# Patient Record
Sex: FEMALE | Race: WHITE | NOT HISPANIC OR LATINO | Employment: FULL TIME | URBAN - METROPOLITAN AREA
[De-identification: names, ages, dates, MRNs, and addresses within clinical notes are randomized per-mention and may not be internally consistent; named-entity substitution may affect disease eponyms.]

---

## 2023-09-21 LAB
ALANINE AMINOTRANSFERASE (SGPT) (U/L) IN SER/PLAS: 19 U/L (ref 7–45)
ALBUMIN (G/DL) IN SER/PLAS: 4.1 G/DL (ref 3.4–5)
ALKALINE PHOSPHATASE (U/L) IN SER/PLAS: 54 U/L (ref 33–110)
ANION GAP IN SER/PLAS: 9 MMOL/L (ref 10–20)
ASPARTATE AMINOTRANSFERASE (SGOT) (U/L) IN SER/PLAS: 20 U/L (ref 9–39)
BASOPHILS (10*3/UL) IN BLOOD BY AUTOMATED COUNT: 0.05 X10E9/L (ref 0–0.1)
BASOPHILS/100 LEUKOCYTES IN BLOOD BY AUTOMATED COUNT: 0.8 % (ref 0–2)
BILIRUBIN TOTAL (MG/DL) IN SER/PLAS: 0.6 MG/DL (ref 0–1.2)
CALCIDIOL (25 OH VITAMIN D3) (NG/ML) IN SER/PLAS: 28 NG/ML
CALCIUM (MG/DL) IN SER/PLAS: 9 MG/DL (ref 8.6–10.3)
CARBON DIOXIDE, TOTAL (MMOL/L) IN SER/PLAS: 29 MMOL/L (ref 21–32)
CHLORIDE (MMOL/L) IN SER/PLAS: 104 MMOL/L (ref 98–107)
CHOLESTEROL (MG/DL) IN SER/PLAS: 147 MG/DL (ref 0–199)
CHOLESTEROL IN HDL (MG/DL) IN SER/PLAS: 44.7 MG/DL
CHOLESTEROL/HDL RATIO: 3.3
CREATININE (MG/DL) IN SER/PLAS: 0.93 MG/DL (ref 0.5–1.05)
EOSINOPHILS (10*3/UL) IN BLOOD BY AUTOMATED COUNT: 0.3 X10E9/L (ref 0–0.7)
EOSINOPHILS/100 LEUKOCYTES IN BLOOD BY AUTOMATED COUNT: 4.6 % (ref 0–6)
ERYTHROCYTE DISTRIBUTION WIDTH (RATIO) BY AUTOMATED COUNT: 12.6 % (ref 11.5–14.5)
ERYTHROCYTE MEAN CORPUSCULAR HEMOGLOBIN CONCENTRATION (G/DL) BY AUTOMATED: 33 G/DL (ref 32–36)
ERYTHROCYTE MEAN CORPUSCULAR VOLUME (FL) BY AUTOMATED COUNT: 90 FL (ref 80–100)
ERYTHROCYTES (10*6/UL) IN BLOOD BY AUTOMATED COUNT: 4.79 X10E12/L (ref 4–5.2)
GFR FEMALE: 81 ML/MIN/1.73M2
GLUCOSE (MG/DL) IN SER/PLAS: 85 MG/DL (ref 74–99)
HEMATOCRIT (%) IN BLOOD BY AUTOMATED COUNT: 43 % (ref 36–46)
HEMOGLOBIN (G/DL) IN BLOOD: 14.2 G/DL (ref 12–16)
IMMATURE GRANULOCYTES/100 LEUKOCYTES IN BLOOD BY AUTOMATED COUNT: 0.3 % (ref 0–0.9)
LDL: 90 MG/DL (ref 0–99)
LEUKOCYTES (10*3/UL) IN BLOOD BY AUTOMATED COUNT: 6.5 X10E9/L (ref 4.4–11.3)
LYMPHOCYTES (10*3/UL) IN BLOOD BY AUTOMATED COUNT: 1.54 X10E9/L (ref 1.2–4.8)
LYMPHOCYTES/100 LEUKOCYTES IN BLOOD BY AUTOMATED COUNT: 23.7 % (ref 13–44)
MONOCYTES (10*3/UL) IN BLOOD BY AUTOMATED COUNT: 0.41 X10E9/L (ref 0.1–1)
MONOCYTES/100 LEUKOCYTES IN BLOOD BY AUTOMATED COUNT: 6.3 % (ref 2–10)
NEUTROPHILS (10*3/UL) IN BLOOD BY AUTOMATED COUNT: 4.17 X10E9/L (ref 1.2–7.7)
NEUTROPHILS/100 LEUKOCYTES IN BLOOD BY AUTOMATED COUNT: 64.3 % (ref 40–80)
PLATELETS (10*3/UL) IN BLOOD AUTOMATED COUNT: 221 X10E9/L (ref 150–450)
POTASSIUM (MMOL/L) IN SER/PLAS: 4.3 MMOL/L (ref 3.5–5.3)
PROTEIN TOTAL: 7.1 G/DL (ref 6.4–8.2)
SODIUM (MMOL/L) IN SER/PLAS: 138 MMOL/L (ref 136–145)
THYROTROPIN (MIU/L) IN SER/PLAS BY DETECTION LIMIT <= 0.05 MIU/L: 1.68 MIU/L (ref 0.44–3.98)
TRIGLYCERIDE (MG/DL) IN SER/PLAS: 60 MG/DL (ref 0–149)
UREA NITROGEN (MG/DL) IN SER/PLAS: 12 MG/DL (ref 6–23)
VLDL: 12 MG/DL (ref 0–40)

## 2023-09-26 PROBLEM — E66.811 CLASS 1 OBESITY WITH BODY MASS INDEX (BMI) OF 32.0 TO 32.9 IN ADULT: Status: ACTIVE | Noted: 2023-09-26

## 2023-09-26 PROBLEM — E66.9 CLASS 1 OBESITY WITH BODY MASS INDEX (BMI) OF 32.0 TO 32.9 IN ADULT: Status: ACTIVE | Noted: 2023-09-26

## 2023-09-26 PROBLEM — R07.89 CHEST PAIN, ATYPICAL: Status: ACTIVE | Noted: 2023-09-26

## 2023-09-26 PROBLEM — J45.990 ASTHMA, EXERCISE INDUCED (HHS-HCC): Status: ACTIVE | Noted: 2023-09-26

## 2023-09-26 RX ORDER — FLUTICASONE PROPIONATE AND SALMETEROL 250; 50 UG/1; UG/1
1 POWDER RESPIRATORY (INHALATION) EVERY 12 HOURS
COMMUNITY

## 2023-09-28 ENCOUNTER — HOSPITAL ENCOUNTER (INPATIENT)
Dept: DATA CONVERSION | Facility: HOSPITAL | Age: 38
LOS: 1 days | Discharge: HOME | End: 2023-09-29
Attending: EMERGENCY MEDICINE | Admitting: EMERGENCY MEDICINE
Payer: COMMERCIAL

## 2023-09-28 DIAGNOSIS — K22.2 ESOPHAGEAL OBSTRUCTION: ICD-10-CM

## 2023-09-28 LAB
ALANINE AMINOTRANSFERASE (SGPT) (U/L) IN SER/PLAS: 25 U/L (ref 7–45)
ALBUMIN (G/DL) IN SER/PLAS: 4.1 G/DL (ref 3.4–5)
ALKALINE PHOSPHATASE (U/L) IN SER/PLAS: 60 U/L (ref 33–110)
ANION GAP IN SER/PLAS: 10 MMOL/L (ref 10–20)
ASPARTATE AMINOTRANSFERASE (SGOT) (U/L) IN SER/PLAS: 32 U/L (ref 9–39)
BILIRUBIN TOTAL (MG/DL) IN SER/PLAS: 0.4 MG/DL (ref 0–1.2)
CALCIUM (MG/DL) IN SER/PLAS: 8.9 MG/DL (ref 8.6–10.3)
CARBON DIOXIDE, TOTAL (MMOL/L) IN SER/PLAS: 26 MMOL/L (ref 21–32)
CHLORIDE (MMOL/L) IN SER/PLAS: 103 MMOL/L (ref 98–107)
CREATININE (MG/DL) IN SER/PLAS: 0.82 MG/DL (ref 0.5–1.05)
ERYTHROCYTE DISTRIBUTION WIDTH (RATIO) BY AUTOMATED COUNT: 12.4 % (ref 11.5–14.5)
ERYTHROCYTE MEAN CORPUSCULAR HEMOGLOBIN CONCENTRATION (G/DL) BY AUTOMATED: 33.8 G/DL (ref 32–36)
ERYTHROCYTE MEAN CORPUSCULAR VOLUME (FL) BY AUTOMATED COUNT: 88 FL (ref 80–100)
ERYTHROCYTES (10*6/UL) IN BLOOD BY AUTOMATED COUNT: 4.86 X10E12/L (ref 4–5.2)
GFR FEMALE: >90 ML/MIN/1.73M2
GLUCOSE (MG/DL) IN SER/PLAS: 114 MG/DL (ref 74–99)
HEMATOCRIT (%) IN BLOOD BY AUTOMATED COUNT: 42.6 % (ref 36–46)
HEMOGLOBIN (G/DL) IN BLOOD: 14.4 G/DL (ref 12–16)
LEUKOCYTES (10*3/UL) IN BLOOD BY AUTOMATED COUNT: 15.3 X10E9/L (ref 4.4–11.3)
MAGNESIUM (MG/DL) IN SER/PLAS: 1.9 MG/DL (ref 1.6–2.4)
PHOSPHATE (MG/DL) IN SER/PLAS: 2 MG/DL (ref 2.5–4.9)
PLATELETS (10*3/UL) IN BLOOD AUTOMATED COUNT: 260 X10E9/L (ref 150–450)
POTASSIUM (MMOL/L) IN SER/PLAS: 4.1 MMOL/L (ref 3.5–5.3)
PROTEIN TOTAL: 7.4 G/DL (ref 6.4–8.2)
SODIUM (MMOL/L) IN SER/PLAS: 135 MMOL/L (ref 136–145)
UREA NITROGEN (MG/DL) IN SER/PLAS: 15 MG/DL (ref 6–23)

## 2023-09-29 VITALS — BODY MASS INDEX: 30.18 KG/M2 | WEIGHT: 159.83 LBS | HEIGHT: 61 IN

## 2023-09-29 PROBLEM — K22.2 OBSTRUCTION, ESOPHAGUS: Status: ACTIVE | Noted: 2023-09-29

## 2023-09-29 RX ORDER — ONDANSETRON HYDROCHLORIDE 2 MG/ML
4 INJECTION, SOLUTION INTRAVENOUS EVERY 6 HOURS PRN
Status: DISCONTINUED | OUTPATIENT
Start: 2023-09-30 | End: 2023-09-29 | Stop reason: HOSPADM

## 2023-09-29 RX ORDER — SODIUM CHLORIDE, SODIUM LACTATE, POTASSIUM CHLORIDE, CALCIUM CHLORIDE 600; 310; 30; 20 MG/100ML; MG/100ML; MG/100ML; MG/100ML
70 INJECTION, SOLUTION INTRAVENOUS CONTINUOUS
Status: DISCONTINUED | OUTPATIENT
Start: 2023-09-30 | End: 2023-09-29 | Stop reason: HOSPADM

## 2023-09-29 RX ORDER — FAMOTIDINE 10 MG/ML
10 INJECTION INTRAVENOUS EVERY 12 HOURS SCHEDULED
Status: DISCONTINUED | OUTPATIENT
Start: 2023-09-30 | End: 2023-09-29 | Stop reason: HOSPADM

## 2023-09-29 RX ORDER — FENTANYL CITRATE 50 UG/ML
50 INJECTION, SOLUTION INTRAMUSCULAR; INTRAVENOUS
Status: DISCONTINUED | OUTPATIENT
Start: 2023-09-30 | End: 2023-09-29 | Stop reason: HOSPADM

## 2023-09-29 RX ORDER — LEVOFLOXACIN 5 MG/ML
750 INJECTION, SOLUTION INTRAVENOUS EVERY 24 HOURS
Status: DISCONTINUED | OUTPATIENT
Start: 2023-09-30 | End: 2023-09-29 | Stop reason: HOSPADM

## 2023-09-30 NOTE — H&P
Service:   Critical Care Service:  ·  Service MICU     History of Present Illness:   Pregnant/Lactating:  ·  Are You Pregnant no (1)   ·  Are You Currently Breastfeeding no (1)     Admission Reason: Esophageal food impaction   HPI:    ZULEIMA KHAN is a pleasant 38-year-old female with only real significant PMH Asthma (controlled) and GERD during pregnancy.  Patient presented to the ED today after  developing esophageal food impaction from swallowing corndogs.  Patient denies any history of dysphagia, odynophagia, or esophageal strictures.  About 1 hour PTA patient withas eating a corn dog and she felt to get stuck in her throat.  After that anything she tried to eat or drink she threw up so she quit eating and drinking however she was still experiencing difficulty swallowing and pressure  discomfort in her throat. Denies alleviating factors.  Patient denies any other symptoms such as fever chest pain palpitation shortness of breath.  She states she is in her usual state of health.  In ED patient was awake alert and oriented, no significant distress, protecting airway.  Patient was given 1 mg IM glucagon and 1 mg IV Ativan in attempt to relax the esophagus and LES and allow food to pass.  Patient's symptoms did not improve with these 2 medications.  ED patient attempted to drink a little water and immediately brought it back up.  Patient denies any history of this in the past.    Dr. Ferreira from GI was immediately consulted.  She came in to perform urgent EGD for removal of food bolus.  Anesthesia was consulted for intubation.  During endoscopy patient was unable to be intubated and her SPO2 started dropping so a CODE BLUE was called however patient never went into true cardiopulmonary arrest.   Multiple providers attempted intubation unfortunately it was unsuccessful.   There was discussion about transferring patient downtown given severity of how difficult her airway is however patient seemed to improve.   Likely passed most if not all of food bolus.  Patient will be admitted for observation to the ICU for close monitoring overnight given risk for decompensation.  Patient arrived to ICU hemodynamically stable, awake alert and oriented, protecting airway, able to swallow secretions, only complaining of some scratchy burning pain/discomfort in her throat which is most likely secondary to multiple intubation attempts.  Of note patient did have some trauma and bleeding to her oropharynx from multiple intubation attempts however there is no active bleeding now.        PMH: As  above  PSH: EGD 9/28/23  Family Hx: No GI disorders including cancer  Social Hx: Social etoh use, denies tobacco or illicit drug use, , works at [employer]      Social History:   Social History:  Smoking Status unable to assess (2)              Allergies:  ·  No Known Allergies :     Medications Prior to Admission:   Admission Medication Reconciliation has not been completed for this patient.    Review of Systems:   ENMT: POSITIVE: Throat Pain     All Other Systems: All other systems reviewed and  are negative     Objective:   Objective Information:    ·  Objective Information      T   P  R  BP   MAP  SpO2   Value  36.2  106  11  101/70   85  98%  Date/Time 9/28 19:44 9/28 21:00 9/28 21:00 9/28 21:00  9/28 21:00 9/28 21:00  Range  (36.2C - 36.6C )  (86 - 106 )  (11 - 18 )  (101 - 136 )/ (67 - 92 )  (77 - 93 )  (91% - 100% )   As of 28-Sep-2023 19:45:00, patient is on 2 L/min of oxygen via nasal cannula.      Pain reported at 9/28 20:30: 8 = Severe        Date:            Weight/Scale Type:  28-Sep-2023 14:30  72.5  kg           Physical Exam by System:    Neurological: alert and oriented x3, intact senses,  motor, response and reflexes, normal strength   Cardiovascular: Regular, rate and rhythm, no murmurs,  2+ equal pulses of the extremities, normal S 1and S 2   Respiratory/Thorax: Patent airways, CTAB, normal  breath sounds with good chest  expansion, thorax symmetric   Gastrointestinal: Protuberant, soft, non-tender,  no rebound tenderness or guarding, no masses palpable, no organomegaly, +BS, no bruits   Skin: Warm and dry, no lesions, no rashes   Musculoskeletal: ROM intact, no joint swelling, normal  strength   Constitutional: Well developed, awake/alert/oriented  x3, no distress, alert and cooperative   Eyes: PERRL, EOMI, clear sclera   ENMT: Few small scratches, erythema and mild swelling  to posterior oropharynx noted, no active bleeding   Head/Neck: Neck supple, no apparent injury, thyroid  without mass or tenderness, No JVD, trachea midline, no bruits   Extremities: normal extremities, no cyanosis edema,  contusions or wounds, no clubbing   Lymphatic: No significant lymphadenopathy   Psychological: Appropriate mood and behavior     Medications:    Medications:          Continuous Medications       --------------------------------    1. Lactated Ringers Infusion:  1000  mL  IntraVenous  <Continuous>         Scheduled Medications       --------------------------------    1. Famotidine Injectable:  10  mg  IntraVenous Push  Every 12 Hours    2. Glucagon Injectable:  1  mg  IntraVenous Push  Once         PRN Medications       --------------------------------    1. fentaNYL Injectable:  50  microgram(s)  IntraVenous Push  Every 3 Hours    2. Ondansetron Injectable:  4  mg  IntraVenous Push  Every 6 Hours          Recent Lab Results:    Results:        I have reviewed these laboratory results:    Complete Blood Count  28-Sep-2023 21:11:00      Result Value    White Blood Cell Count  15.3   H   Red Blood Cell Count  4.86    HGB  14.4    HCT  42.6    MCV  88    MCHC  33.8    PLT  260    RDW-CV  12.4         Assessment and Plan:   Neurology:  Diagnosis:    38-year-old female with only real significant PMH Asthma (controlled) and GERD during pregnancy.  Patient presented to the ED today after developing esophageal food  impaction from swallowing  corndogs.  Patient denies any history of dysphagia, odynophagia, or esophageal strictures.  About 1 hour PTA patient was eating a corn dog and she felt to get stuck in her throat.  After that anything she tried to eat or drink she threw up so she quit eating and drinking however she was still experiencing difficulty swallowing and pressure discomfort  in her throat. Denies alleviating factors.  Patient denies any other symptoms such as fever chest pain palpitation shortness of breath.  She states she is in her usual state of health.  In ED patient was awake alert and oriented, no significant distress, protecting airway.  Patient was given 1 mg IM glucagon and 1 mg IV Ativan in attempt to relax the esophagus and LES and allow food to pass.  Patient's symptoms did not improve with these 2 medications.  ED patient attempted to drink a little water and immediately brought it back up.  Patient denies any history of this in the past.    Dr. Ferreira from GI was immediately consulted.  She came in to perform urgent EGD for removal of food bolus.  Anesthesia was consulted for intubation.  During endoscopy patient was unable to be intubated and her SPO2 started dropping so a CODE BLUE was called however patient never went into true cardiopulmonary arrest.   Multiple providers attempted intubation unfortunately it was unsuccessful.   There was discussion about transferring patient downtown given severity of how difficult her airway is however patient seemed to improve.  Likely passed most if not all of food bolus.  Patient will be admitted for observation to the ICU for close monitoring overnight given risk for decompensation.  Patient arrived to ICU hemodynamically stable, awake alert and oriented, protecting airway, able to swallow secretions, only complaining of some scratchy burning pain/discomfort in her throat which is most likely secondary to multiple intubation attempts.  Of note patient did have some trauma and  "bleeding to her oropharynx from multiple intubation attempts however there is no active bleeding now.      ASSESSMENT/ PLAN  #Esophageal food bolus impaction  -2/2 corndog, 1mg IM glucagon w/ 1 mg IV Ativan given in ED with no immediate success, s/p EGD, given improvement in symptoms, likely passed food bolus   -give another dose of Glucagon but give 1 mg IV  -monitor closely in ICU overnight  -NPO, IVF w/ LR @ 70/h while NPO  -will slowly start with ice chips tomorrow if able and advance as able  -Analgesia/anti-emetics as needed  -if patient does not decompensate overnight and can safely swallow, will DC home tomorrow    #Pharyngitis  -2/2 multiple intubation attempts, no concerning trauma  -Analgesia  -Ok for cepacol lozenge when able to have some oral intake    #Difficult airway  -unsuccessful intubation after multiple attempts, mild trauma posterior oropharynx  -contact transfer center immediately if patient decompensates from airway perspective      Myrna South AGACNP  Adult Gerontology Acute Care Nurse Practitioner  Pulmonary and Critical Care Medicine  [facility]  [phone]      ***Of note, this documentation is completed using the Dragon Dictation system (voice recognition software). There may be spelling and/or grammatical errors that  were not corrected prior to final submission.***      Code Status:  ·  Code Status Full Code       Electronic Signatures:  Myrna South (APRN-CNP)  (Signed 28-Sep-2023 21:59)   Authored: Service, History of Present Illness, Comorbidities,  Social History, Allergies, Medications Prior to Admission, Review of Systems, Objective, Assessment and Plan, Note Completion      Last Updated: 28-Sep-2023 21:59 by Myrna South (APRN-CNP)    References:  1.  Data Referenced From \"Triage - ED\" 28-Sep-2023 14:30  2.  Data Referenced From \"Consult-Gastroenterology\" 28-Sep-2023 17:40   "

## 2023-09-30 NOTE — DISCHARGE SUMMARY
Send Summary:   Discharge Summary Providers:  Provider Role Provider Name   · Referring Catrina Rivera   · Attending Gharibeh, Tarek   · Primary Catrina Rivera       Note Recipients: Catrina Rivera MD - [id number]  [preferred]       Discharge:    Summary:   Admission Date: .28-Sep-2023 14:28:00   Discharge Date: 29-Sep-2023   Attending Physician at Discharge: Gharibeh, Tarek   Admission Reason: Esophageal food impaction (1)   Final Discharge Diagnoses: Esophageal obstruction  due to food impaction   Procedures: none   Condition at Discharge: Satisfactory   Disposition at Discharge: .Home   Vital Signs:        T   P  R  BP   MAP  SpO2   Value  37.1  106  18  109/58   80  98%  Date/Time 9/29 14:00 9/29 14:00 9/29 12:00 9/29 13:00  9/29 13:00 9/29 14:00  Range  (36.2C - 37.1C )  (86 - 108 )  (11 - 33 )  (96 - 136 )/ (57 - 92 )  (76 - 98 )  (91% - 100% )   As of 28-Sep-2023 21:00:00, patient is on 2 L/min of oxygen via room air.  Highest temp of 37.1 C was recorded at 9/29 14:00    Date:            Weight/Scale Type:  Height:   28-Sep-2023 20:00  72.5  kg / bed  154.9  cm  Hospital Course:    ZULEIMA KHAN is a pleasant 38-year-old female with only real significant PMH Asthma (controlled) and GERD during pregnancy.  Patient presented to the ED today after  developing esophageal food impaction from swallowing corndogs. Patient denies any history of dysphagia, odynophagia, or esophageal strictures. About 1 hour PTA patient was eating a corn dog and she felt it got stuck in her throat.  After that anything  she tried to eat or drink she threw up so she quit eating and drinking however she was still experiencing difficulty swallowing and pressure discomfort in her throat/ epigastric area. Patient was given 1 mg IM glucagon and 1 mg IV Ativan in attempt to  relax the esophagus and LES and allow food to pass. Symptoms did not improve with these 2 medications and continued to have vomiting with attempts with oral  intake. Dr. Ferreira from GI was immediately consulted and decision was made to perform urgent EGD  for removal of food bolus. After induction of anesthetic agents anesthesia was unable to successfully intubate patient and a CODE BLUE was activated (there was no loss of pulse during procedure) due to significant hypoxia into 40-60s. Multiple providers  attempted intubation unfortunately it was unsuccessful. At this time decision was made to abort procedure. Was given additional dose of glucagon and had relief of epigastric pressure. Was monitored in the ICU overnight for evaluation of airway. The next  day was able to tolerate a soft diet, maintaining adequate oxygen saturations, and no signs/ symptoms of distress. Post procedue xray of neck showed prevertebral soft tissue thickening with narrowing of the airway and airspace opacity of the right lower  lobe suggestive of pneumonia. Was discharged home in stable condition with strict instructions to return to the emergency room if difficulty swallowing with vomiting and/ or difficulty breathing along with prescription for 7-day course of augmentin and  protonix. Instructed patient to notify providers in the future of difficulty airway/ intubation during this hospitalization.           Discharge Information:    and Continuing Care:   Lab Results - Pending:    None  Radiology Results - Pending: None   Discharge Instructions:    Activity:           activity as tolerated.          May shower..            May return to school/work.  Monday            May drive..      Nutrition/Diet:           regular          Diet Consistency/Texture:   soft    Additional Orders:           Additional Instructions:   Return to the emergency room with any difficulty swallowing, talking, and/ or breathing  Call to schedule follow up with GI Dr. Ferreira  Call to schedule follow up with primary care provider    Follow Up Appointments:    Follow-Up Appointment 01:           Physician/Dept/Service:  "   Gastroenterology          Reason for Referral:   Food impaction, hospital follow up          Location:   [facility]          Phone Number:   [phone]    Follow-Up Appointment 02:           Physician/Dept/Service:   primary care          Reason for Referral:   hospital follow up, pneumonia          Call to Schedule in:   1 week    Discharge Medications: Home Medication   amoxicillin-clavulanate 600 mg-42.9 mg/5 mL oral liquid - 5 milliliter(s) orally 2 times a day   Protonix 40 mg oral delayed release tablet - 1 tab(s) orally once a day      PRN Medication     DNR Status:   ·  Code Status Code Status order at time of discharge: Full Code       Electronic Signatures:  Patti Salgado (APRN-CNP)  (Signed 29-Sep-2023 15:20)   Authored: Send Summary, Summary Content, Ongoing Care,  DNR Status, Note Completion      Last Updated: 29-Sep-2023 15:20 by Patti Salgado (APRN-CNP)    References:  1.  Data Referenced From \"History and Physical - Critical Care\" 28-Sep-2023 21:17   "

## 2023-09-30 NOTE — PROGRESS NOTES
Service: Gastroenterology     Subjective Data:   ZULEIMA KHAN is a 38 year old Female who is Hospital Day # 2.     Today, she is feeling okay.  She denies any nausea or vomiting.  She is tolerating ice chips and clear liquids.  She has tried and soft at lunch.    Objective Data:     Objective Information:      T   P  R  BP   MAP  SpO2   Value  36.3  96  13  105/68   82  95%  Date/Time 9/29 6:00 9/29 8:00 9/29 8:00 9/29 8:00  9/29 8:00 9/29 8:00  Range  (36.2C - 36.6C )  (86 - 108 )  (11 - 33 )  (96 - 136 )/ (57 - 92 )  (76 - 98 )  (91% - 100% )   As of 28-Sep-2023 21:00:00, patient is on 2 L/min of oxygen via room air.      Pain reported at 9/29 0:00: sleeping    ---- Intake and Output  -----  Mn/Dy/Year Time  Intake   Output  Net  Sep 29, 2023 6:00 am  660   0  660    The Intake and Output Totals for the last 24 hours are:      Intake   Output  Net      660   null  null    Physical Exam Narrative:  ·  Physical Exam:    General appearance: No acute distress, cooperative.  Eyes: Nonicteric, no redness or proptosis  Ears, nose, mouth, and throat: Moist mucous membranes,  Neck: Supple, no lymphadenopathy or thyromegaly  Lungs: Clear to auscultation bilaterally  CV: Regular rate and rhythm, no murmur; no pitting edema in the lower extremities  Abd: soft, non-tender; non-distended; normal active bowel sounds  Skin: No rashes or lesions; no ecchymosis  MSK: No deformities or joint edema/redness/tenderness  Neuro: Alert and oriented ×3, non-focal    Medication:    Medications:          Continuous Medications       --------------------------------    1. Lactated Ringers Infusion:  1000  mL  IntraVenous  <Continuous>         Scheduled Medications       --------------------------------    1. Famotidine Injectable:  10  mg  IntraVenous Push  Every 12 Hours    2. levoFLOXacin (LEVAQUIN) 750 mg IVPB/ Premix 150 mL:  150  mL  IntraVenous Piggyback  Every 24 Hours         PRN Medications        --------------------------------    1. fentaNYL Injectable:  50  microgram(s)  IntraVenous Push  Every 3 Hours    2. Ondansetron Injectable:  4  mg  IntraVenous Push  Every 6 Hours        Recent Lab Results:    Results:    CBC: 9/28/2023 21:11              \     Hgb     /                              \     14.4       /  WBC  ----------------  Plt               15.3 H    ----------------    260              /     Hct     \                              /     42.6       \            RBC: 4.86     MCV: 88           CMP: 9/28/2023 21:11  NA+        Cl-     BUN  /                         135 L   103    15  /  --------------------------------  Glucose                ---------------------------  114 H    K+     HCO3-   Creat \                         4.1    26    0.82  \           \  T Bili  /                    \  0.4  /  AST  x ---- x ALT        32 x ---- x 25         /  Alk P   \               /  60  \  Calcium : 8.9     Anion Gap : 10     Albumin : 4.1     T Protein : 7.4           Radiology Results:    Results:        Impression:    1. Prevertebral soft tissue thickening with narrowing of the airway,  prevertebral abscess or edema cannot be excluded. Contrast-enhanced  CT can be obtained for further evaluation.  2. Airspace opacity at the right lung base, suggestive of pneumonia.  This can also be better evaluated on CT.        MACRO:  None     Xray Neck Soft Tissue [Sep 28 2023 11:26PM]      Assessment and Plan:   Code Status:  ·  Code Status Full Code     Assessment:    38-year-old female presents emergency room with acute onset of a food impaction at 1:00 while she was eating corn dogs.  It is not cleared with attempts at glucagon  and Ativan.  She is otherwise hemodynamically stable with no significant medical history or symptoms of dysphagia odynophagia or early satiety.  She does report having heartburn during her pregnancy.  She was scheduled for an urgent EGD for removal of  the food bolus.  Anesthesia  for intubation patient is agreeable.  Unfortunately multiple attempts for intubation were made which were unsuccessful.  CODE BLUE was called for assistance and with bronchoscope intubation was still unsuccessful.  She was  stabilized on oxygen and sent to MICU for further management.    Food impaction- resolved, will need EGD for EoE and GERD  Difficult airway    Plan  Recommend outpatient EGD for further evaluation  Recommend PPI 40 mg daily until scope  Advance diet as tolerated  Continue supportive care per primary team    Discussed with Dr. Ferreira, GI to sign off  Yamileth Barrett PA-C    Plan of Care Reviewed With:  Plan of Care Reviewed With: patient; spouse       Electronic Signatures:  Yamileth Barrett (PAC)  (Signed 29-Sep-2023 11:19)   Authored: Service, Subjective Data, Objective Data, Assessment  and Plan, Note Completion      Last Updated: 29-Sep-2023 11:19 by Yamileth Barrett (PAC)

## 2023-10-02 ENCOUNTER — PATIENT OUTREACH (OUTPATIENT)
Dept: PRIMARY CARE | Facility: CLINIC | Age: 38
End: 2023-10-02
Payer: COMMERCIAL

## 2023-10-02 DIAGNOSIS — T18.128A ESOPHAGEAL OBSTRUCTION DUE TO FOOD IMPACTION: ICD-10-CM
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
## 2023-10-02 RX ORDER — PANTOPRAZOLE SODIUM 40 MG/1
40 TABLET, DELAYED RELEASE ORAL DAILY
COMMUNITY
Start: 2023-09-30 | End: 2023-11-13 | Stop reason: SDUPTHER

## 2023-10-02 RX ORDER — AMOXICILLIN AND CLAVULANATE POTASSIUM 600; 42.9 MG/5ML; MG/5ML
POWDER, FOR SUSPENSION ORAL
COMMUNITY
Start: 2023-09-29 | End: 2023-10-10 | Stop reason: ALTCHOICE

## 2023-10-02 NOTE — PROGRESS NOTES
Discharge Facility:  [facility]    Discharge Diagnosis:  Esophageal obstruction  due to food impaction    Admission Date:9/28/23  Discharge Date: 9/29/23    PCP Appointment Date:   10/10/2023 10:30 AM (30 min)    PRIMARY CARE ESTABLISHED   Catrina Wright MD     Specialist Appointment Date:    10/16/2023 2:20 PM (20 min)    GASTRO ESTABLISHED PATIENT VT   Yamileth Barrett PA-C     Hospital Encounter and Summary: not available at this time to link but in EPIC   See discharge assessment below for further details  I introduced myself and the TCM program to Odessa Hauser. I gave my contact information for any further questions.  Engagement  Call Start Time: 1311 (10/2/2023  1:15 PM)    Medications  Medications reviewed with patient/caregiver?: Yes (10/2/2023  1:15 PM)  Is the patient having any side effects they believe may be caused by any medication additions or changes?: No (10/2/2023  1:15 PM)  Does the patient have all medications ordered at discharge?: Yes (10/2/2023  1:15 PM)  Prescription Comments: Discharge Medications:  amoxicillin-clavulanate 600 mg-42.9 mg/5 mL oral liquid - 5 milliliter(s) orally 2 times a day  &  Protonix 40 mg oral delayed release tablet - 1 tab(s) orally once a day (10/2/2023  1:15 PM)  Is the patient taking all medications as directed (includes completed medication regime)?: Yes (10/2/2023  1:15 PM)  Care Management Interventions: Provided patient education (10/2/2023  1:15 PM)    Appointments  Does the patient have a primary care provider?: Yes (Catrina Wright MD) (10/2/2023  1:15 PM)  Care Management Interventions: Verified appointment date/time/provider (10/2/2023  1:15 PM)  Has the patient kept scheduled appointments due by today?: Yes (10/2/2023  1:15 PM)  Care Management Interventions: Educated on importance of keeping appointment (10/2/2023  1:15 PM)    Self Management  Has home health visited the patient within 72 hours of  discharge?: Not applicable (10/2/2023  1:15 PM)    Patient Teaching  Does the patient have access to their discharge instructions?: Yes (10/2/2023  1:15 PM)  Care Management Interventions: Reviewed instructions with patient; Educated on MyChart (10/2/2023  1:15 PM)  What is the patient's perception of their health status since discharge?: Returned to baseline/stable (10/2/2023  1:15 PM)  Is the patient/caregiver able to teach back the hierarchy of who to call/visit for symptoms/problems? PCP, Specialist, Home Health nurse, Urgent Care, ED, 911: Yes (10/2/2023  1:15 PM)    Wrap Up  Call End Time: 1317 (10/2/2023  1:15 PM)

## 2023-10-07 PROBLEM — Z87.891 FORMER SMOKER: Status: ACTIVE | Noted: 2023-10-07

## 2023-10-07 PROBLEM — J45.909 RAD (REACTIVE AIRWAY DISEASE) (HHS-HCC): Status: ACTIVE | Noted: 2023-10-07

## 2023-10-07 RX ORDER — GABAPENTIN 100 MG/1
CAPSULE ORAL
COMMUNITY
Start: 2016-03-22 | End: 2023-10-10 | Stop reason: ALTCHOICE

## 2023-10-07 RX ORDER — IBUPROFEN 800 MG/1
800 TABLET ORAL EVERY 6 HOURS PRN
COMMUNITY
Start: 2023-07-06 | End: 2023-10-10 | Stop reason: WASHOUT

## 2023-10-07 RX ORDER — NAPROXEN 500 MG/1
1 TABLET ORAL
COMMUNITY
Start: 2019-03-27 | End: 2023-10-10 | Stop reason: WASHOUT

## 2023-10-10 ENCOUNTER — OFFICE VISIT (OUTPATIENT)
Dept: PRIMARY CARE | Facility: CLINIC | Age: 38
End: 2023-10-10
Payer: COMMERCIAL

## 2023-10-10 VITALS
DIASTOLIC BLOOD PRESSURE: 78 MMHG | TEMPERATURE: 98.1 F | BODY MASS INDEX: 31.95 KG/M2 | OXYGEN SATURATION: 99 % | HEART RATE: 73 BPM | HEIGHT: 61 IN | WEIGHT: 169.25 LBS | SYSTOLIC BLOOD PRESSURE: 122 MMHG

## 2023-10-10 DIAGNOSIS — T18.128A ESOPHAGEAL OBSTRUCTION DUE TO FOOD IMPACTION: Primary | ICD-10-CM
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
## 2023-10-10 DIAGNOSIS — K21.9 GASTROESOPHAGEAL REFLUX DISEASE WITHOUT ESOPHAGITIS: ICD-10-CM

## 2023-10-10 DIAGNOSIS — J45.990 ASTHMA, EXERCISE INDUCED (HHS-HCC): ICD-10-CM

## 2023-10-10 DIAGNOSIS — Z71.2 ENCOUNTER TO DISCUSS TEST RESULTS: ICD-10-CM

## 2023-10-10 PROCEDURE — 99214 OFFICE O/P EST MOD 30 MIN: CPT | Performed by: FAMILY MEDICINE

## 2023-10-10 PROCEDURE — 1036F TOBACCO NON-USER: CPT | Performed by: FAMILY MEDICINE

## 2023-10-10 ASSESSMENT — PATIENT HEALTH QUESTIONNAIRE - PHQ9
SUM OF ALL RESPONSES TO PHQ9 QUESTIONS 1 AND 2: 0
1. LITTLE INTEREST OR PLEASURE IN DOING THINGS: NOT AT ALL
2. FEELING DOWN, DEPRESSED OR HOPELESS: NOT AT ALL

## 2023-10-10 NOTE — ASSESSMENT & PLAN NOTE
Reviewed lab/testing results with the patient and provided patient education regarding the results.

## 2023-10-10 NOTE — PROGRESS NOTES
"Subjective   Chief complaint: Odessa Hauser is a 38 y.o. female who presents for Results (Patient in office today for 1 month follow up. ).    HPI:  Hospital follow up.  Has a history of asthma but that has been stable for her.  A couple weeks ago she had something get stuck in her esophagus and she was unable to swallow or drink.  She would have emesis from the obstruction.  She presented to the ER and had a scope done.  They were unable to intubate her and told her she had \"Small Neck Syndrome.\"  She coded during this event and the obstruction cleared on its own from her being relaxed.  She is going to be seen by Dr. Ferreira for an upper endoscopy on Monday, Octobert 16, 2023.  She is feeling much better now.  Is watching what she is eating; having soft foods now.          Objective   /78 (BP Location: Left arm, Patient Position: Sitting)   Pulse 73   Temp 36.7 °C (98.1 °F) (Temporal)   Ht 1.549 m (5' 1\")   Wt 76.8 kg (169 lb 4 oz)   SpO2 99%   BMI 31.98 kg/m²     Physical Exam  General:  Alert, oriented, no acute distress  Eyes:  Sclerae white, PER, conjunctivae clear  ENT:  No nasal congestion.    Neck: Supple  Respiratory:  Normal breath sounds.  No wheezing, rhonchi nor crackles.  No dyspnea.  Cardiovascular:  S1 and S2 positive.  Regular rate and rhythm.  No gallops.  No murmurs.  Vascular:  No edema.  Skin warm and dry.  CNS:  No gross neurological deficits.  Gait within normal limits.    Psychiatric:  Affect is positive and appropriate.  No depression.  No anxiety.     Review of Systems   I have reviewed and reconciled the medication list with the patient today.   Current Outpatient Medications:     albuterol 108 (90 Base) MCG/ACT inhaler, Inhale 2 puffs if needed for wheezing (every 4 to 6 hours)., Disp: , Rfl:     fluticasone propion-salmeteroL (Advair Diskus) 250-50 mcg/dose diskus inhaler, Inhale 1 puff every 12 hours. Rinse mouth with water after use to reduce aftertaste and incidence of " candidiasis. Do not swallow., Disp: , Rfl:     pantoprazole (ProtoNix) 40 mg EC tablet, Take 1 tablet (40 mg) by mouth once daily., Disp: , Rfl:      Imaging:  XR chest 1 view    Result Date: 9/28/2023  Interpreted By:  KODY PARKER DO MRN: [id number] Patient Name: ZULEIMA KHAN  STUDY: SOFT TISSUE NECK; CHEST 1 VIEW; ;  9/28/2023 10:00 pm; 9/28/2023 9:59 pm  INDICATION: R/O Aspiration .  Presented DX Code: K22.2 Esophageal obstruction  COMPARISON: Chest x-ray 07/12/2016.  ACCESSION NUMBER(S): [id number]; [id number]  ORDERING CLINICIAN: TETE PEARCE  FINDINGS: AP and lateral views of the soft tissue neck and AP view of the chest were obtained.  SOFT TISSUE NECK:  There is prevertebral soft tissue thickening. There is narrowing of the airway. The airway remains patent. The epiglottis does not appear thickened. No acute osseous changes.  CHEST:  Monitoring wires are overlying the patient. The cardiomediastinal silhouette is within normal limits. Airspace opacity noted at the right lung base. No pleural effusion or pneumothorax.      1. Prevertebral soft tissue thickening with narrowing of the airway, prevertebral abscess or edema cannot be excluded. Contrast-enhanced CT can be obtained for further evaluation. 2. Airspace opacity at the right lung base, suggestive of pneumonia. This can also be better evaluated on CT.   MACRO: None    XR soft tissue neck    Result Date: 9/28/2023  Interpreted By:  ELENA DO KODY MRN: [id number] Patient Name: ZULEIMA KHAN  STUDY: SOFT TISSUE NECK; CHEST 1 VIEW; ;  9/28/2023 10:00 pm; 9/28/2023 9:59 pm  INDICATION: R/O Aspiration .  Presented DX Code: K22.2 Esophageal obstruction  COMPARISON: Chest x-ray 07/12/2016.  ACCESSION NUMBER(S): [id number]; [id number]  ORDERING CLINICIAN: TETE PEARCE  FINDINGS: AP and lateral views of the soft tissue neck and AP view of the chest were obtained.  SOFT TISSUE NECK:  There is prevertebral soft tissue thickening. There is narrowing of the airway. The  airway remains patent. The epiglottis does not appear thickened. No acute osseous changes.  CHEST:  Monitoring wires are overlying the patient. The cardiomediastinal silhouette is within normal limits. Airspace opacity noted at the right lung base. No pleural effusion or pneumothorax.      1. Prevertebral soft tissue thickening with narrowing of the airway, prevertebral abscess or edema cannot be excluded. Contrast-enhanced CT can be obtained for further evaluation. 2. Airspace opacity at the right lung base, suggestive of pneumonia. This can also be better evaluated on CT.   MACRO: None       Labs reviewed:    Lab Results   Component Value Date    WBC 15.3 (H) 09/28/2023    HGB 14.4 09/28/2023    HCT 42.6 09/28/2023     09/28/2023    CHOL 147 09/21/2023    TRIG 60 09/21/2023    HDL 44.7 09/21/2023    ALT 25 09/28/2023    AST 32 09/28/2023     (L) 09/28/2023    K 4.1 09/28/2023     09/28/2023    CREATININE 0.82 09/28/2023    BUN 15 09/28/2023    CO2 26 09/28/2023    TSH 1.68 09/21/2023       Assessment/Plan   Problem List Items Addressed This Visit       Asthma, exercise induced     Controlled.  No change in management.         Encounter to discuss test results     Reviewed lab/testing results with the patient and provided patient education regarding the results.           Esophageal obstruction due to food impaction - Primary     Resolved,  reviewed.          Other Visit Diagnoses       Gastroesophageal reflux disease without esophagitis                Continue current medications as listed  Follow up in 6 months.

## 2023-10-16 ENCOUNTER — OFFICE VISIT (OUTPATIENT)
Dept: GASTROENTEROLOGY | Facility: CLINIC | Age: 38
End: 2023-10-16
Payer: COMMERCIAL

## 2023-10-16 VITALS
DIASTOLIC BLOOD PRESSURE: 83 MMHG | HEIGHT: 61 IN | BODY MASS INDEX: 32.25 KG/M2 | WEIGHT: 170.8 LBS | SYSTOLIC BLOOD PRESSURE: 132 MMHG | HEART RATE: 79 BPM

## 2023-10-16 DIAGNOSIS — T18.128A ESOPHAGEAL OBSTRUCTION DUE TO FOOD IMPACTION: ICD-10-CM
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
## 2023-10-16 DIAGNOSIS — R13.19 ESOPHAGEAL DYSPHAGIA: Primary | ICD-10-CM

## 2023-10-16 PROCEDURE — 1036F TOBACCO NON-USER: CPT | Performed by: PHYSICIAN ASSISTANT

## 2023-10-16 PROCEDURE — 99213 OFFICE O/P EST LOW 20 MIN: CPT | Performed by: PHYSICIAN ASSISTANT

## 2023-10-16 ASSESSMENT — ENCOUNTER SYMPTOMS
FEVER: 0
CHILLS: 0
HEMATURIA: 0
DIZZINESS: 0
EYE PAIN: 0
DYSURIA: 0
ARTHRALGIAS: 0
JOINT SWELLING: 0
NUMBNESS: 0
APPETITE CHANGE: 0
UNEXPECTED WEIGHT CHANGE: 0
HEADACHES: 0
SORE THROAT: 0
MYALGIAS: 0
WEAKNESS: 0
TROUBLE SWALLOWING: 0

## 2023-10-16 NOTE — PROGRESS NOTES
Gastroenterology Office Visit     History of Present Illness:   Odessa Hauser is a 38 y.o. female who presents to GI clinic for hospital follow-up.  She was admitted / for food impaction.  It did resolve on its own.    She her discharge she has been sticking to a soft diet and only had one episode of feeling like something we getting hung up on the way down.  It was a potato salad she she does not think she chew thoroughly.  Otherwise she has had no issues.  She denies any nausea, vomiting, odynophagia, abdominal pain, constipation, diarrhea, melena, or hematochezia.  She has been moving her bowels regularly.    She is taking her pantoprazole daily and Tums as needed    Last colonoscopy: NA  Last endosopy: NA    Abdominal surgeries include c section    Review of Systems  Review of Systems   Constitutional:  Negative for appetite change, chills, fever and unexpected weight change.   HENT:  Negative for mouth sores, sore throat and trouble swallowing.    Eyes:  Negative for pain and visual disturbance.   Cardiovascular:  Negative for chest pain.   Genitourinary:  Negative for decreased urine volume, dysuria and hematuria.   Musculoskeletal:  Negative for arthralgias, joint swelling and myalgias.   Skin:  Negative for pallor and rash.   Neurological:  Negative for dizziness, weakness, numbness and headaches.       Past Medical History   has no past medical history on file.     Past Surgical History  Past Surgical History:   Procedure Laterality Date     SECTION, CLASSIC         Social History   reports that she has quit smoking. Her smoking use included cigarettes. She has never used smokeless tobacco. She reports that she does not currently use alcohol. She reports that she does not use drugs.     Family History  family history includes Asthma in her father; COPD in her father; Diabetes in her father; Hypertension in her father; Skin cancer in her maternal grandfather.   No family history of stomach or  colon cancer    Allergies  No Known Allergies    Medications  Current Outpatient Medications   Medication Instructions    albuterol 108 (90 Base) MCG/ACT inhaler 2 puffs, inhalation, As needed    fluticasone propion-salmeteroL (Advair Diskus) 250-50 mcg/dose diskus inhaler 1 puff, inhalation, Every 12 hours, Rinse mouth with water after use to reduce aftertaste and incidence of candidiasis. Do not swallow.    pantoprazole (PROTONIX) 40 mg, oral, Daily        Objective   Visit Vitals  /83   Pulse 79        Physical Exam  Constitutional:       General: She is not in acute distress.     Appearance: Normal appearance.   HENT:      Mouth/Throat:      Mouth: Mucous membranes are moist.      Pharynx: Oropharynx is clear.   Eyes:      General: No scleral icterus.     Extraocular Movements: Extraocular movements intact.      Conjunctiva/sclera: Conjunctivae normal.      Pupils: Pupils are equal, round, and reactive to light.   Cardiovascular:      Rate and Rhythm: Normal rate and regular rhythm.      Heart sounds: No murmur heard.  Pulmonary:      Effort: Pulmonary effort is normal.      Breath sounds: No wheezing or rhonchi.   Abdominal:      General: Bowel sounds are normal. There is no distension.      Palpations: Abdomen is soft. There is no mass.      Tenderness: There is no abdominal tenderness.      Hernia: No hernia is present.   Musculoskeletal:         General: No swelling or deformity.   Skin:     General: Skin is warm.      Coloration: Skin is not jaundiced.   Neurological:      General: No focal deficit present.      Mental Status: She is alert and oriented to person, place, and time.   Psychiatric:         Mood and Affect: Mood normal.         LABS  Pertinent labs were reviewed with the patient   Latest Reference Range & Units 09/21/23 09:15 09/28/23 21:11   GLUCOSE 74 - 99 mg/dL 85 114 (H)   SODIUM 136 - 145 mmol/L 138 135 (L)   POTASSIUM 3.5 - 5.3 mmol/L 4.3 4.1   CHLORIDE 98 - 107 mmol/L 104 103    Bicarbonate 21 - 32 mmol/L 29 26   Anion Gap 10 - 20 mmol/L 9 (L) 10   Blood Urea Nitrogen 6 - 23 mg/dL 12 15   Creatinine 0.50 - 1.05 mg/dL 0.93 0.82   Calcium 8.6 - 10.3 mg/dL 9.0 8.9   PHOSPHORUS 2.5 - 4.9 mg/dL  2.0 (L)   Albumin 3.4 - 5.0 g/dL 4.1 4.1   Alkaline Phosphatase 33 - 110 U/L 54 60   ALT 7 - 45 U/L 19 25   AST 9 - 39 U/L 20 32   Bilirubin Total 0.0 - 1.2 mg/dL 0.6 0.4      Latest Reference Range & Units 09/21/23 09:15   Thyroid Stimulating Hormone 0.44 - 3.98 mIU/L 1.68      Latest Reference Range & Units 09/21/23 09:15 09/28/23 21:11   WBC 4.4 - 11.3 x10E9/L 6.5 15.3 (H)   RBC 4.00 - 5.20 x10E12/L 4.79 4.86   HEMOGLOBIN 12.0 - 16.0 g/dL 14.2 14.4   HEMATOCRIT 36.0 - 46.0 % 43.0 42.6   MCV 80 - 100 fL 90 88   MCHC 32.0 - 36.0 g/dL 33.0 33.8   RED CELL DISTRIBUTION WIDTH 11.5 - 14.5 % 12.6 12.4   Platelets 150 - 450 x10E9/L 221 260     Radiology  Pertinent radiology was reviewed with the patient  XR Chest  9/28/2023  IMPRESSION:  1. Prevertebral soft tissue thickening with narrowing of the airway,  prevertebral abscess or edema cannot be excluded. Contrast-enhanced  CT can be obtained for further evaluation.  2. Airspace opacity at the right lung base, suggestive of pneumonia.  This can also be better evaluated on CT.    Endoscopy  NA    Assessment/Plan   Odessa Hauser is a 38 y.o. female who presents to GI clinic for for hospital follow-up.  She was admitted 9-28/9-29 for food impaction.  It did resolve on its own..    Esophageal obstruction due to food impaction  Sedation issue while inpatient.  Anesthesia unable to intubate.  Procedure was aborted and patient was admitted to ICU for monitoring.  She did feel as if the food bolus had passed.  Plan for outpatient EGD.    Patient to continue pantoprazole 40 mg daily  Educated about  soft and minced diet  Educated about chewing thoroughly.      Odessa was seen today for dysphagia.  Diagnoses and all orders for this visit:  Esophageal dysphagia  -      EGD; Future  Esophageal obstruction due to food impaction       Yamileth Barrett PA-C

## 2023-10-16 NOTE — ASSESSMENT & PLAN NOTE
Sedation issue while inpatient.  Anesthesia unable to intubate.  Procedure was aborted and patient was admitted to ICU for monitoring.  She did feel as if the food bolus had passed.  Plan for outpatient EGD.    Patient to continue pantoprazole 40 mg daily  Educated about  soft and minced diet  Educated about chewing thoroughly.

## 2023-10-18 ENCOUNTER — PATIENT OUTREACH (OUTPATIENT)
Dept: PRIMARY CARE | Facility: CLINIC | Age: 38
End: 2023-10-18
Payer: COMMERCIAL

## 2023-10-18 NOTE — PROGRESS NOTES
Call regarding appt. with PCP on (10/12/23 and GI 10/16/23 after hospitalization.  At time of outreach call the patient feels as if their condition has returned to baseline since last visit.  Reviewed the PCP & GI appointment with the pt and addressed any questions or concerns.

## 2023-10-27 ENCOUNTER — HOSPITAL ENCOUNTER (OUTPATIENT)
Dept: GASTROENTEROLOGY | Facility: HOSPITAL | Age: 38
Setting detail: OUTPATIENT SURGERY
Discharge: HOME | End: 2023-10-27
Payer: COMMERCIAL

## 2023-10-27 ENCOUNTER — ANESTHESIA (OUTPATIENT)
Dept: GASTROENTEROLOGY | Facility: HOSPITAL | Age: 38
End: 2023-10-27
Payer: COMMERCIAL

## 2023-10-27 ENCOUNTER — ANESTHESIA EVENT (OUTPATIENT)
Dept: GASTROENTEROLOGY | Facility: HOSPITAL | Age: 38
End: 2023-10-27
Payer: COMMERCIAL

## 2023-10-27 VITALS
SYSTOLIC BLOOD PRESSURE: 103 MMHG | HEIGHT: 62 IN | RESPIRATION RATE: 16 BRPM | HEART RATE: 76 BPM | TEMPERATURE: 98.2 F | WEIGHT: 165.79 LBS | DIASTOLIC BLOOD PRESSURE: 59 MMHG | OXYGEN SATURATION: 96 % | BODY MASS INDEX: 30.51 KG/M2

## 2023-10-27 DIAGNOSIS — R13.19 ESOPHAGEAL DYSPHAGIA: Primary | ICD-10-CM

## 2023-10-27 PROBLEM — K21.9 GASTROESOPHAGEAL REFLUX DISEASE WITHOUT ESOPHAGITIS: Status: ACTIVE | Noted: 2023-10-27

## 2023-10-27 PROBLEM — R07.89 CHEST PAIN, ATYPICAL: Status: RESOLVED | Noted: 2023-09-26 | Resolved: 2023-10-27

## 2023-10-27 LAB — PREGNANCY TEST URINE, POC: NEGATIVE

## 2023-10-27 PROCEDURE — 43249 ESOPH EGD DILATION <30 MM: CPT | Performed by: STUDENT IN AN ORGANIZED HEALTH CARE EDUCATION/TRAINING PROGRAM

## 2023-10-27 PROCEDURE — 43239 EGD BIOPSY SINGLE/MULTIPLE: CPT | Performed by: STUDENT IN AN ORGANIZED HEALTH CARE EDUCATION/TRAINING PROGRAM

## 2023-10-27 PROCEDURE — 88305 TISSUE EXAM BY PATHOLOGIST: CPT | Mod: TC | Performed by: STUDENT IN AN ORGANIZED HEALTH CARE EDUCATION/TRAINING PROGRAM

## 2023-10-27 PROCEDURE — 7100000010 HC PHASE TWO TIME - EACH INCREMENTAL 1 MINUTE

## 2023-10-27 PROCEDURE — 88305 TISSUE EXAM BY PATHOLOGIST: CPT | Mod: TC,SUR | Performed by: STUDENT IN AN ORGANIZED HEALTH CARE EDUCATION/TRAINING PROGRAM

## 2023-10-27 PROCEDURE — 7100000009 HC PHASE TWO TIME - INITIAL BASE CHARGE

## 2023-10-27 PROCEDURE — 2500000005 HC RX 250 GENERAL PHARMACY W/O HCPCS: Performed by: REGISTERED NURSE

## 2023-10-27 PROCEDURE — 88305 TISSUE EXAM BY PATHOLOGIST: CPT | Performed by: PATHOLOGY

## 2023-10-27 PROCEDURE — 3700000001 HC GENERAL ANESTHESIA TIME - INITIAL BASE CHARGE

## 2023-10-27 PROCEDURE — 2720000007 HC OR 272 NO HCPCS

## 2023-10-27 PROCEDURE — 81025 URINE PREGNANCY TEST: CPT | Performed by: STUDENT IN AN ORGANIZED HEALTH CARE EDUCATION/TRAINING PROGRAM

## 2023-10-27 PROCEDURE — 3700000002 HC GENERAL ANESTHESIA TIME - EACH INCREMENTAL 1 MINUTE

## 2023-10-27 PROCEDURE — 2500000004 HC RX 250 GENERAL PHARMACY W/ HCPCS (ALT 636 FOR OP/ED): Performed by: STUDENT IN AN ORGANIZED HEALTH CARE EDUCATION/TRAINING PROGRAM

## 2023-10-27 PROCEDURE — C1726 CATH, BAL DIL, NON-VASCULAR: HCPCS

## 2023-10-27 RX ORDER — SODIUM CHLORIDE, SODIUM LACTATE, POTASSIUM CHLORIDE, CALCIUM CHLORIDE 600; 310; 30; 20 MG/100ML; MG/100ML; MG/100ML; MG/100ML
20 INJECTION, SOLUTION INTRAVENOUS CONTINUOUS
Status: DISCONTINUED | OUTPATIENT
Start: 2023-10-27 | End: 2023-10-28 | Stop reason: HOSPADM

## 2023-10-27 RX ORDER — LIDOCAINE HYDROCHLORIDE 20 MG/ML
INJECTION, SOLUTION INFILTRATION; PERINEURAL AS NEEDED
Status: DISCONTINUED | OUTPATIENT
Start: 2023-10-27 | End: 2023-10-27

## 2023-10-27 RX ORDER — ETOMIDATE 2 MG/ML
INJECTION INTRAVENOUS AS NEEDED
Status: DISCONTINUED | OUTPATIENT
Start: 2023-10-27 | End: 2023-10-27

## 2023-10-27 RX ADMIN — SODIUM CHLORIDE, POTASSIUM CHLORIDE, SODIUM LACTATE AND CALCIUM CHLORIDE 20 ML/HR: 600; 310; 30; 20 INJECTION, SOLUTION INTRAVENOUS at 08:39

## 2023-10-27 RX ADMIN — ETOMIDATE 12 MG: 40 INJECTION, SOLUTION INTRAVENOUS at 10:05

## 2023-10-27 RX ADMIN — ETOMIDATE 6 MG: 40 INJECTION, SOLUTION INTRAVENOUS at 10:13

## 2023-10-27 RX ADMIN — LIDOCAINE HYDROCHLORIDE 5 ML: 20 INJECTION, SOLUTION INFILTRATION; PERINEURAL at 10:05

## 2023-10-27 RX ADMIN — ETOMIDATE 6 MG: 40 INJECTION, SOLUTION INTRAVENOUS at 10:09

## 2023-10-27 RX ADMIN — ETOMIDATE 4 MG: 40 INJECTION, SOLUTION INTRAVENOUS at 10:18

## 2023-10-27 ASSESSMENT — PAIN SCALES - GENERAL
PAIN_LEVEL: 0
PAINLEVEL_OUTOF10: 0 - NO PAIN

## 2023-10-27 ASSESSMENT — PAIN - FUNCTIONAL ASSESSMENT
PAIN_FUNCTIONAL_ASSESSMENT: 0-10

## 2023-10-27 ASSESSMENT — COLUMBIA-SUICIDE SEVERITY RATING SCALE - C-SSRS
6. HAVE YOU EVER DONE ANYTHING, STARTED TO DO ANYTHING, OR PREPARED TO DO ANYTHING TO END YOUR LIFE?: NO
1. IN THE PAST MONTH, HAVE YOU WISHED YOU WERE DEAD OR WISHED YOU COULD GO TO SLEEP AND NOT WAKE UP?: NO
2. HAVE YOU ACTUALLY HAD ANY THOUGHTS OF KILLING YOURSELF?: NO

## 2023-10-27 NOTE — H&P
Outpatient Hospital Procedure H&P    Patient Profile-Procedures  Name Odessa Hauser  Date of Birth 1985  MRN [id number]  Address   [address] [place] [id number] [address] [place] [address]    Primary Phone Number [phone]  Secondary Phone Number    PCP Catrina Wright    Procedure(s):  EGD.  Primary contact name and number   Extended Emergency Contact Information  Primary Emergency Contact: Darryn Hauser  Home Phone: [phone]  Relation: Spouse    General Health  Weight   Vitals:    10/27/23 0800   Weight: 75.2 kg (165 lb 12.6 oz)     BMI Body mass index is 30.32 kg/m².    Allergies  No Known Allergies    Past Medical History   Past Medical History:   Diagnosis Date    Asthma     patient states controlled with inhalers    GERD (gastroesophageal reflux disease)     states protonix helps       Provider assessment  Diagnosis: Follow up of esophageal food bolus   Medication Reviewed - yes  Prior to Admission medications    Medication Sig Start Date End Date Taking? Authorizing Provider   albuterol 108 (90 Base) MCG/ACT inhaler Inhale 2 puffs if needed for wheezing (every 4 to 6 hours).   Yes Historical Provider, MD   fluticasone propion-salmeteroL (Advair Diskus) 250-50 mcg/dose diskus inhaler Inhale 1 puff every 12 hours. Rinse mouth with water after use to reduce aftertaste and incidence of candidiasis. Do not swallow.   Yes Historical Provider, MD   pantoprazole (ProtoNix) 40 mg EC tablet Take 1 tablet (40 mg) by mouth once daily. 9/30/23  Yes Historical Provider, MD       Physical Exam  Vitals:    10/27/23 0800   BP: 113/71   Pulse: 85   Resp: 16   Temp: 36.3 °C (97.3 °F)   SpO2: 97%        General: A&Ox3, NAD.  HEENT: AT/NC.   CV: RRR. No murmur.  Resp: CTA bilaterally. No wheezing, rhonchi or rales.   GI: Soft, NT/ND. BSx4.  Extrem: No edema. Pulses intact.  Skin: No Jaundice.   Neuro: No focal deficits.   Psych: Normal mood and affect.      NOTE: significant risk of procedure given  difficulty in intubation at last EGD discussed in detail.   Sedation Plan: Deep Sedation.  Procedure Plan - pre-procedural (re)assesment completed by physician:  discharge/transfer patient when discharge criteria met    Issac Roberts DO  10/27/2023 10:22 AM

## 2023-10-27 NOTE — DISCHARGE INSTRUCTIONS
Moderate Sedation in Adults Discharge Instructions    About this topic  Moderate sedation is also known as conscious sedation. It changes your state of being awake or consciousness. With this sedation, you may feel slight pain or pressure during a procedure. The drugs help you to relax and may even allow you to sleep. It will be easy to wake you and you may talk and answer questions while under sedation. Most likely, you will not remember what happens while under this sedation.  What care is needed at home?  Ask your doctor what you need to do when you go home. Make sure you understand everything the doctor says. This way you will know what you need to do.  You will not be allowed to drive right away after the procedure. Ask a family member or a friend to drive you home.  Do not operate heavy or dangerous machinery for at least 24 hours.  Do not make major decisions or sign important papers for at least 24 hours. You may not be thinking clearly.  Avoid beer, wine, or mixed drinks (alcohol) for at least 24 hours.  You are at a higher risk of falling for at least 24 hours after moderate sedation.  Take extra care when you get up.  Do not change positions quickly.  Do not rush when you need to go to the bathroom or to answer the phone.  Ask for help if you feel unsteady when you try to walk.  Wear shoes with non-slip soles and low heels.  What follow-up care is needed?  Your doctor may ask you to make visits to the office to check on your progress. Be sure to keep these visits. Your doctor may also refer you to other doctors or tell you that you need more tests or care.  What drugs may be needed?  The doctor may order drugs to:  Help with pain  Treat an upset stomach or throwing up  Will physical activity be limited?  Rest for the day of the procedure. Avoid strenuous activities like heavy lifting and hard exercise. Talk to your doctor about whether you need to limit lifting or exercise after your procedure.  What  changes to diet are needed?  Start with a light diet when you are fully awake. This includes things that are easy to swallow like soups, pudding, jello, toast, and eggs. Slowly progress to your normal diet.  What problems could happen?  Low blood pressure  Breathing problems  Upset stomach or throwing up  Dizziness  When do I need to call the doctor?  Feel dizzy, weak, or tired  Faint  Very bad headache  Upset stomach or throwing up  To follow up for more tests or care  Teach Back: Helping You Understand  The Teach Back Method helps you understand the information we are giving you. After you talk with the staff, tell them in your own words what you learned. This helps to make sure the staff has described each thing clearly. It also helps to explain things that may have been confusing. Before going home, make sure you can do these:  I can tell you about my procedure.  I can tell you if I need more tests or care.  I can tell you what is good for me to eat and drink the next day.  I can tell you what I would do if I feel dizzy, weak, or tired.  Last Reviewed Date  2020-03-02  Consumer Information Use and Disclaimer  This generalized information is a limited summary of diagnosis, treatment, and/or medication information. It is not meant to be comprehensive and should be used as a tool to help the user understand and/or assess potential diagnostic and treatment options. It does NOT include all information about conditions, treatments, medications, side effects, or risks that may apply to a specific patient. It is not intended to be medical advice or a substitute for the medical advice, diagnosis, or treatment of a health care provider based on the health care provider's examination and assessment of a patient’s specific and unique circumstances. Patients must speak with a health care provider for complete information about their health, medical questions, and treatment options, including any risks or benefits regarding  use of medications. This information does not endorse any treatments or medications as safe, effective, or approved for treating a specific patient. Up[employer], [employer] and its affiliates disclaim any warranty or liability relating to this information or the use thereof. The use of this information is governed by the Terms of Use, available at https://www.[employer].com/en/know/clinical-effectiveness-terms  Copyright © 2023 [employer], Inc. and its affiliates and/or licensors. All rights rUpper GI Endoscopy Discharge Instructions    About this topic  This procedure is done to view your upper gastrointestinal (GI) tract. This includes your throat and food pipe (esophagus). It also includes your stomach and the first part of the small bowel. Some people have this test for problems like coughing or throwing up blood. Other people may be having bad belly pain or blood in their stool. You may be having trouble swallowing or problems with acid reflux.  Doctors often use this test to look for problems like:  Ulcers  Cancer or tumor growths  Internal bleeding  Swelling  Inflammation  Infection  [place]'s esophagus  Gastroesophageal reflux disease or GERD  Swallowing problems    What care is needed at home?  Ask your doctor what you need to do when you go home. Make sure you ask questions if you do not understand what the doctor says. This way you will know what you need to do.  Your throat may feel sore. Your belly may also feel bloated. Your doctor may give you drugs to help with pain.  Talk to your doctor about when it is safe for you to go back to eating your normal diet and taking your drugs. You can drink fluid once the numbing drugs in your throat wear off.  What follow-up care is needed?  Your doctor may ask you to make visits to the office to check on your progress. Be sure to keep these visits.  The results of this test may help your doctor understand what kind of problem you have with your upper GI tract. Together you  can make a plan for more care.  What drugs may be needed?  The doctor may order drugs to:  Help with pain  Decrease the acid in your stomach  Will physical activity be limited?  You may need to limit your activity for the rest of the day. After that, you will likely be able to go back to your normal activities.  What changes to diet are needed?  Soft foods like pudding or soups may be easier to eat at first. Ask your doctor if you need to make any changes to your diet.  What problems could happen?  Painful swallowing  Upset stomach  Injury to food pipe  Throwing up  Tear in the esophagus  When do I need to call the doctor?  Signs of infection. These include a fever of 100.4°F (38°C) or higher, chills.  Very bad belly pain  Throwing up blood  Your belly is hard and swollen  Trouble swallowing or breathing  Upset stomach and throwing up  Bloody or black tarry stools  Cough, shortness of breath, or chest pain  A crunching feeling under the skin in your neck  You are not feeling better in 2 to 3 days or you are feeling worse  Teach Back: Helping You Understand  The Teach Back Method helps you understand the information we are giving you. After you talk with the staff, tell them in your own words what you learned. This helps to make sure the staff has described each thing clearly. It also helps to explain things that may have been confusing. Before going home, make sure you can do these:  I can tell you about my procedure.  I can tell you what changes I need to make with my diet or drugs.  I can tell you what I will do if I have very bad belly pain, throwing up blood, or my belly is hard and swollen.  Where can I learn more?  [employer]  https://www.gastro.org/practice-guidance/gi-patient-center/topic/upper-gi-endoscopy  Last Reviewed Date

## 2023-10-27 NOTE — ANESTHESIA POSTPROCEDURE EVALUATION
Patient: Odessa Hauser    Procedure Summary       Date: 10/27/23 Room / Location: [facility]    Anesthesia Start: 1000 Anesthesia Stop: 1027    Procedure: EGD Diagnosis:       Esophageal dysphagia      Esophageal dysphagia    Scheduled Providers: Issac Roberts DO Responsible Provider: Tejas Stevens MD    Anesthesia Type: MAC ASA Status: 2            Anesthesia Type: MAC    Vitals Value Taken Time   /96 10/27/23 1025   Temp 36 10/27/23 1027   Pulse 94 10/27/23 1025   Resp 16 10/27/23 1025   SpO2 95 % 10/27/23 1025       Anesthesia Post Evaluation    Patient location during evaluation: bedside  Patient participation: complete - patient participated  Level of consciousness: awake and awake and alert  Pain score: 0  Pain management: adequate  Airway patency: patent  Cardiovascular status: acceptable  Respiratory status: acceptable, unassisted, nonlabored ventilation and airway suctioned  Hydration status: euvolemic        No notable events documented.

## 2023-10-27 NOTE — NURSING NOTE
Patient tolerated procedure well. Appears comfortable with no complaints of pain. VS stable. Arousable prior to transport. Patient transported to [facility] via cart.  Report called              . Handoff completed

## 2023-10-27 NOTE — NURSING NOTE
Huddle and Timeout completed together with team. Patient wristband and DREAD information verified.

## 2023-10-27 NOTE — ANESTHESIA PREPROCEDURE EVALUATION
Patient: Odessa Hauser    Procedure Information       Date/Time: 10/27/23 0945    Scheduled providers: Issac Roberts DO    Procedure: EGD    Location: [facility]            Relevant Problems   Cardiovascular   (+) Chest pain, atypical (Resolved)      Endocrine   (+) Class 1 obesity with body mass index (BMI) of 32.0 to 32.9 in adult      GI   (+) Gastroesophageal reflux disease without esophagitis      Pulmonary   (+) Asthma, exercise induced      Musculoskeletal   (+) Spondylosis of lumbar region without myelopathy or radiculopathy   (+) Spondylosis of thoracic region without myelopathy or radiculopathy      Other   (+) Spondylosis of thoracic region without myelopathy or radiculopathy       Clinical information reviewed:   Tobacco  Allergies  Meds   Med Hx  Surg Hx  OB Status  Fam Hx  Soc   Hx        NPO Detail:  NPO/Void Status  Date of Last Liquid: 10/26/23  Time of Last Liquid: 2100  Date of Last Solid: 10/26/23  Time of Last Solid: 2100         Physical Exam    Airway  Mallampati: III  TM distance: <3 FB  Neck ROM: full     Cardiovascular    Dental - normal exam     Pulmonary    Abdominal      Other findings: Prior hx of difficult intubation. Pt unable to be intubated after multiple attempts by different providers. Procedure was aborted.          Anesthesia Plan    ASA 2     MAC     intravenous induction   Anesthetic plan and risks discussed with patient.

## 2023-11-09 ENCOUNTER — APPOINTMENT (OUTPATIENT)
Dept: GASTROENTEROLOGY | Facility: EXTERNAL LOCATION | Age: 38
End: 2023-11-09
Payer: COMMERCIAL

## 2023-11-10 LAB
LABORATORY COMMENT REPORT: NORMAL
PATH REPORT.FINAL DX SPEC: NORMAL
PATH REPORT.GROSS SPEC: NORMAL
PATH REPORT.MICROSCOPIC SPEC OTHER STN: NORMAL
PATH REPORT.RELEVANT HX SPEC: NORMAL
PATH REPORT.TOTAL CANCER: NORMAL

## 2023-11-13 ENCOUNTER — TELEMEDICINE (OUTPATIENT)
Dept: GASTROENTEROLOGY | Facility: CLINIC | Age: 38
End: 2023-11-13
Payer: COMMERCIAL

## 2023-11-13 DIAGNOSIS — T18.128A ESOPHAGEAL OBSTRUCTION DUE TO FOOD IMPACTION: Primary | ICD-10-CM

## 2023-11-13 DIAGNOSIS — K21.9 GASTROESOPHAGEAL REFLUX DISEASE WITHOUT ESOPHAGITIS: ICD-10-CM

## 2023-11-13 DIAGNOSIS — K22.4 ESOPHAGEAL DYSMOTILITY: ICD-10-CM
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
## 2023-11-13 PROCEDURE — 99213 OFFICE O/P EST LOW 20 MIN: CPT | Performed by: PHYSICIAN ASSISTANT

## 2023-11-13 RX ORDER — HYOSCYAMINE SULFATE 0.125 MG
0.12 TABLET ORAL
Qty: 90 TABLET | Refills: 11 | Status: SHIPPED | OUTPATIENT
Start: 2023-11-13 | End: 2024-11-12

## 2023-11-13 RX ORDER — PANTOPRAZOLE SODIUM 40 MG/1
40 TABLET, DELAYED RELEASE ORAL
Qty: 30 TABLET | Refills: 11 | Status: SHIPPED | OUTPATIENT
Start: 2023-11-13 | End: 2024-11-12

## 2023-11-13 NOTE — ASSESSMENT & PLAN NOTE
Schatzki's ring dilated to 18 mm.  Only benefited for few days after procedure.    We will try hyoscyamine to help with LES mobility

## 2023-11-13 NOTE — PROGRESS NOTES
Gastroenterology Office Visit     History of Present Illness:   Odessa Hauser is a 38 y.o. female who presents to GI clinic for follow-up of EGD.  She was last seen by me 10/16/2023 for hospital follow-up where she had a food impaction.    She has been doing okay since her endoscopy.  She says she felt great the first couple of days after the procedure and did not have any issues with swallowing.  After about a week she started feeling foods going down and having a sensation that they may be getting hung up.  She did have an instance where she decided not to eat the rest of the day feeling like there is a bubble or something stuck in her esophagus.    She denies any symptoms of acid reflux or odynophagia.    Last colonoscopy: NA  Last endosopy: 10/27/2023    Abdominal surgeries include c section     Review of Systems  Review of Systems   All other systems reviewed and are negative.      Past Medical History   has a past medical history of Asthma and GERD (gastroesophageal reflux disease).     Past Surgical History  Past Surgical History:   Procedure Laterality Date    BRONCHOSCOPY N/A      SECTION, CLASSIC N/A        Social History   reports that she has quit smoking. Her smoking use included cigarettes. She has never used smokeless tobacco. She reports that she does not currently use alcohol. She reports that she does not use drugs.     Family History  family history includes Asthma in her father; COPD in her father; Diabetes in her father; Hypertension in her father; Skin cancer in her maternal grandfather.   No family history of stomach or colon cancer    Allergies  No Known Allergies    Medications  Current Outpatient Medications   Medication Instructions    albuterol 108 (90 Base) MCG/ACT inhaler 2 puffs, inhalation, As needed    fluticasone propion-salmeteroL (Advair Diskus) 250-50 mcg/dose diskus inhaler 1 puff, inhalation, Every 12 hours, Rinse mouth with water after use to reduce aftertaste and  "incidence of candidiasis. Do not swallow.    hyoscyamine (ANASPAZ, LEVSIN) 0.125 mg, oral, 3 times daily before meals    pantoprazole (PROTONIX) 40 mg, oral, Daily before breakfast        Objective   There were no vitals taken for this visit.     Physical Exam  Constitutional:       Appearance: Normal appearance.   HENT:      Head: Normocephalic and atraumatic.   Pulmonary:      Effort: Pulmonary effort is normal.   Musculoskeletal:         General: Normal range of motion.   Neurological:      General: No focal deficit present.      Mental Status: She is alert and oriented to person, place, and time.   Psychiatric:         Mood and Affect: Mood normal.         Behavior: Behavior normal.         LABS  Pertinent labs were reviewed with the patient  Lab Results   Component Value Date    WBC 15.3 (H) 09/28/2023    WBC 6.5 09/21/2023    HGB 14.4 09/28/2023    HGB 14.2 09/21/2023    HCT 42.6 09/28/2023    HCT 43.0 09/21/2023     09/28/2023     09/21/2023      Lab Results   Component Value Date     (L) 09/28/2023    K 4.1 09/28/2023     09/28/2023    CO2 26 09/28/2023    BUN 15 09/28/2023    CREATININE 0.82 09/28/2023    GLUCOSE 114 (H) 09/28/2023    CALCIUM 8.9 09/28/2023      Lab Results   Component Value Date    ALKPHOS 60 09/28/2023    ALKPHOS 54 09/21/2023    BILITOT 0.4 09/28/2023    BILITOT 0.6 09/21/2023    PROT 7.4 09/28/2023    PROT 7.1 09/21/2023    ALT 25 09/28/2023    ALT 19 09/21/2023    AST 32 09/28/2023    AST 20 09/21/2023      No results found for: \"INR\"   No results found for: \"IRON\", \"TIBC\"   No results found for: \"FERRITIN\"   Lab Results   Component Value Date    TSH 1.68 09/21/2023        Radiology  Pertinent radiology was reviewed with the patient  NA    Endoscopy  Colonoscopy NA    EGD by Dr Roberts 10/27/2023  Edematous mucosa with concentric rings and loss of vascular pattern in the esophagus  Performed forceps biopsies in the upper third of the esophagus and lower third " of the esophagus to rule out eosinophilic esophagitis  3 cm hiatal hernia  The stomach and duodenum appeared normal.  Non-obstructing Schatzki ring in the lower third of the esophagus and GE junction; dilated from 13.5 mm starting size to 18 mm end size. Dilation caused mucosal tears and disruption of ring; post-dilation mucosal tears were superficial    A.  DISTAL ESOPHAGUS, BIOPSIES:   - UNREMARKABLE NONKERATINIZED SQUAMOUS (ESOPHAGEAL) EPITHELIUM; NEGATIVE FOR DYSPLASIA.  - NO EVIDENCE OF EOSINOPHILIC ESOPHAGITIS IN SUBMITTED MATERIAL.  - GLANDULAR/SPECIALIZED COLUMNAR (GOBLET CELL METAPLASTIC) EPITHELIUM NOT PRESENT.      B.  PROXIMAL ESOPHAGUS, BIOPSIES:  - UNREMARKABLE NONKERATINIZED SQUAMOUS (ESOPHAGEAL) EPITHELIUM; NEGATIVE FOR DYSPLASIA.  - NO EVIDENCE OF EOSINOPHILIC ESOPHAGITIS IN SUBMITTED MATERIAL.  - GLANDULAR/SPECIALIZED COLUMNAR (GOBLET CELL METAPLASTIC) EPITHELIUM NOT PRESENT.     Assessment/Plan   Odessa Hauser is a 38 y.o. female who presents to GI clinic for follow-up of EGD.  She was last seen by me 10/16/2023 for hospital follow-up where she had a food impaction.    Gastroesophageal reflux disease without esophagitis  Continue pantoprazole 40 mg daily    Esophageal obstruction due to food impaction  Schatzki's ring dilated to 18 mm.  Only benefited for few days after procedure.    We will try hyoscyamine to help with LES mobility      Diagnoses and all orders for this visit:  Esophageal obstruction due to food impaction  -     pantoprazole (ProtoNix) 40 mg EC tablet; Take 1 tablet (40 mg) by mouth once daily in the morning. Take before meals.  Gastroesophageal reflux disease without esophagitis  -     pantoprazole (ProtoNix) 40 mg EC tablet; Take 1 tablet (40 mg) by mouth once daily in the morning. Take before meals.  Esophageal dysmotility  -     hyoscyamine (Anaspaz, Levsin) 0.125 mg tablet; Take 1 tablet (0.125 mg) by mouth 3 times a day before meals.       Recommend follow-up 2 to 3  months  Yamileth Gaitan PA-C

## 2023-11-14 ENCOUNTER — APPOINTMENT (OUTPATIENT)
Dept: GASTROENTEROLOGY | Facility: CLINIC | Age: 38
End: 2023-11-14
Payer: COMMERCIAL

## 2023-11-22 ENCOUNTER — PATIENT OUTREACH (OUTPATIENT)
Dept: PRIMARY CARE | Facility: CLINIC | Age: 38
End: 2023-11-22
Payer: COMMERCIAL

## 2023-11-22 NOTE — PROGRESS NOTES
Unable to reach patient for one month +post discharge follow up call.   LVM with call back number for patient to call if needed to assist with any questions or concerns patient may have.

## 2023-11-27 ENCOUNTER — APPOINTMENT (OUTPATIENT)
Dept: GASTROENTEROLOGY | Facility: HOSPITAL | Age: 38
End: 2023-11-27
Payer: COMMERCIAL

## 2023-12-28 ENCOUNTER — PATIENT OUTREACH (OUTPATIENT)
Dept: PRIMARY CARE | Facility: CLINIC | Age: 38
End: 2023-12-28
Payer: COMMERCIAL

## 2024-02-26 DIAGNOSIS — J45.990 ASTHMA, EXERCISE INDUCED (HHS-HCC): Primary | ICD-10-CM

## 2024-02-26 RX ORDER — ALBUTEROL SULFATE 90 UG/1
2 AEROSOL, METERED RESPIRATORY (INHALATION) EVERY 6 HOURS PRN
COMMUNITY
End: 2024-02-26 | Stop reason: SDUPTHER

## 2024-02-26 RX ORDER — ALBUTEROL SULFATE 90 UG/1
2 AEROSOL, METERED RESPIRATORY (INHALATION) EVERY 6 HOURS PRN
Qty: 18 G | Refills: 1 | Status: SHIPPED | OUTPATIENT
Start: 2024-02-26

## 2024-03-06 NOTE — CONSULTS
Service:   Service: Gastroenterology     History of Present Illness:   HPI:    ZULEIMA KHAN is a 38 year old Female who presents to the emergency room with acute onset of a food impaction.  She was eating corn dogs around 1:00 this afternoon  and noticed that a food bolus got stuck.  She has been unable to tolerate her own secretions since then.  She denies any previous episodes of dysphagia odynophagia or early satiety.  Her only past medical history is asthma.  She reports some discomfort  in her chest but denies any other chest pain shortness of breath fever chills night sweat etc.  She was given glucagon and Ativan in the emergency room without any improvement in her symptoms.    Past medical history: Asthma    Surgical history: None    Family history: No GI cancers    Social history: Occasional alcohol no tobacco recreational drug use    Meds: Albuterol and Advair    Allergies: No known drug allergies    Review Family/Social History and ROS:   Social History     Smoking Status: unable to assess  (1)     Constitutional: NEGATIVE: Fever, Chills     ENMT: NEGATIVE: Throat Pain     Respiratory: NEGATIVE: Shortness of Breath     Cardiac: NEGATIVE: Chest Pain     Gastrointestinal: NEGATIVE: Nausea, Vomiting, Diarrhea,  Constipation, Abdominal Pain              Allergies:  ·  No Known Allergies :     Objective:     Objective Information:        T   P  R  BP   MAP  SpO2   Value  36.6  89  18  136/92      98%  Date/Time 9/28 14:30 9/28 14:30 9/28 14:30 9/28 14:30    9/28 14:30  Range  (36.6C - 36.6C )  (89 - 89 )  (18 - 18 )  (136 - 136 )/ (92 - 92 )    (98% - 98% )    Physical Exam by System     Constitutional: Well developed, awake/alert/oriented  x3, no distress, alert and cooperative   Head/Neck: Neck supple, no apparent injury, thyroid  without mass or tenderness, No crepitus   Respiratory/Thorax: Patent airways, CTAB, normal  breath sounds with good chest expansion,   Cardiovascular: Regular, rate and rhythm,  no murmurs,  normal S 1and S 2   Gastrointestinal: Nondistended, soft, non-tender,  no rebound tenderness or guarding, no masses palpable, no organomegaly, +BS,       Assessment:    38-year-old female presents emergency room with acute onset of a food impaction at 1:00 while she was eating corn dogs.  It is not cleared with attempts at glucagon  and Ativan.  She is otherwise hemodynamically stable with no significant medical history or symptoms of dysphagia odynophagia or early satiety.  She does report having heartburn during her pregnancy.  We will schedule her for an urgent EGD for removal  of the food bolus.  Anesthesia for intubation patient is agreeable    Plan of Care Reviewed With   Plan of Care Reviewed With: patient; spouse     Consult Status   Consult Status    (select all that apply): initial  consult complete, will follow     Electronic Signatures for Addendum Section:   Annie Ferreira) (Signed Addendum 28-Sep-2023 22:31)   Pt brought from the ER to the Endoscopy suite - she reported she vomited some contents but still had some discomfort. Plan to complete EGD to evaluate for food  impaction given sx were not resolved. time out completed - plan for intubation per anes due to food impaction for airway protection.  multiple attempts to intubate unsuccessful due to poor visualization - see anes record for details  [employer] called for support in airway management - ICU attending present -attempt to intubate with bronchoscope still unsuccessful  Decision made not to proceed with further attempts - no further sedation was given  VS monitoring throughout and post procedure. PT was weaned to room air  Pt was awake, talking in the room -given two sips of water which she tolerated   brought to room - updated during events  Pt transferred to the MICU for close monitoring overnight- remain NPO for now = based on symptoms we can decide on timing of EGD but will need difficult airway support for any  "further procedure  Spoke to ICU nurse around 2130 - pt was sitting up in bed - chest pain had resolved - reports some throat discomfort - pending soft tissue xray/CT  will follow closely     Electronic Signatures:  Annie Ferreira)  (Signed 28-Sep-2023 17:45)   Authored: Service, History of Present Illness, Review  Family/Social History and ROS, Allergies, Objective, Assessment/Recommendations, Note Completion      Last Updated: 28-Sep-2023 22:31 by Annie Ferreira (MD)    References:  1.  Data Referenced From \"Risk Screen - Adult Emergency\" 28-Sep-2023 17:21   "

## 2024-04-10 ENCOUNTER — APPOINTMENT (OUTPATIENT)
Dept: PRIMARY CARE | Facility: CLINIC | Age: 39
End: 2024-04-10
Payer: COMMERCIAL

## 2024-07-24 ENCOUNTER — HOSPITAL ENCOUNTER (OUTPATIENT)
Dept: RADIOLOGY | Facility: CLINIC | Age: 39
Discharge: HOME | End: 2024-07-24
Payer: COMMERCIAL

## 2024-07-24 ENCOUNTER — OFFICE VISIT (OUTPATIENT)
Dept: ORTHOPEDIC SURGERY | Facility: CLINIC | Age: 39
End: 2024-07-24
Payer: COMMERCIAL

## 2024-07-24 VITALS — WEIGHT: 160 LBS | BODY MASS INDEX: 29.44 KG/M2 | HEIGHT: 62 IN

## 2024-07-24 DIAGNOSIS — M25.531 RIGHT WRIST PAIN: ICD-10-CM

## 2024-07-24 DIAGNOSIS — M77.8 EXTENSOR CARPI ULNARIS TENDINITIS: Primary | ICD-10-CM

## 2024-07-24 PROCEDURE — 3008F BODY MASS INDEX DOCD: CPT | Performed by: INTERNAL MEDICINE

## 2024-07-24 PROCEDURE — 73110 X-RAY EXAM OF WRIST: CPT | Mod: RIGHT SIDE | Performed by: INTERNAL MEDICINE

## 2024-07-24 PROCEDURE — L3908 WHO COCK-UP NONMOLDE PRE OTS: HCPCS | Performed by: INTERNAL MEDICINE

## 2024-07-24 PROCEDURE — 73110 X-RAY EXAM OF WRIST: CPT | Mod: RT

## 2024-07-24 PROCEDURE — 99204 OFFICE O/P NEW MOD 45 MIN: CPT | Performed by: INTERNAL MEDICINE

## 2024-07-24 PROCEDURE — 99213 OFFICE O/P EST LOW 20 MIN: CPT | Performed by: INTERNAL MEDICINE

## 2024-07-24 PROCEDURE — 1036F TOBACCO NON-USER: CPT | Performed by: INTERNAL MEDICINE

## 2024-07-24 RX ORDER — METHYLPREDNISOLONE 4 MG/1
4 TABLET ORAL
Qty: 21 TABLET | Refills: 0 | Status: CANCELLED | OUTPATIENT
Start: 2024-07-24 | End: 2024-07-31

## 2024-07-24 RX ORDER — METHYLPREDNISOLONE 4 MG/1
TABLET ORAL
Qty: 1 EACH | Refills: 0 | Status: SHIPPED | OUTPATIENT
Start: 2024-07-24

## 2024-07-24 ASSESSMENT — PATIENT HEALTH QUESTIONNAIRE - PHQ9
1. LITTLE INTEREST OR PLEASURE IN DOING THINGS: NOT AT ALL
SUM OF ALL RESPONSES TO PHQ9 QUESTIONS 1 AND 2: 0
2. FEELING DOWN, DEPRESSED OR HOPELESS: NOT AT ALL

## 2024-07-24 NOTE — PROGRESS NOTES
Acute Injury New Patient Visit    CC:   Chief Complaint   Patient presents with    Right Wrist - Pain     Patient was moving a table and heard a popped in her Rt wrist on 07/18/24  Xray today 3 V       HPI: Odessa is a 39 y.o. female presents today for evaluation for acute right wrist injury sustained 6 days ago while she was moving a table. She states that she felt a pop in her right wrist and popping has continued daily since. She is here for initial evaluation and x-rays.        Review of Systems   GENERAL: Negative for malaise, significant weight loss, fever  MUSCULOSKELETAL: See HPI  NEURO:  Negative for numbness / tingling     Past Medical History  Past Medical History:   Diagnosis Date    Asthma ([facility])     patient states controlled with inhalers    GERD (gastroesophageal reflux disease)     states protonix helps       Medication review  Medication Documentation Review Audit       Reviewed by Yessi Marcelo RN (Registered Nurse) on 10/27/23 at 0823      Medication Order Taking? Sig Documenting Provider Last Dose Status   albuterol 108 (90 Base) MCG/ACT inhaler [id number] Yes Inhale 2 puffs if needed for wheezing (every 4 to 6 hours). Aundrea Hernandez MD 10/27/2023 0730 Active   fluticasone propion-salmeteroL (Advair Diskus) 250-50 mcg/dose diskus inhaler [id number] Yes Inhale 1 puff every 12 hours. Rinse mouth with water after use to reduce aftertaste and incidence of candidiasis. Do not swallow. Historical Provider, MD Past Week Active   pantoprazole (ProtoNix) 40 mg EC tablet [id number] Yes Take 1 tablet (40 mg) by mouth once daily. Aundrea Provider, MD 10/26/2023 Active                    Allergies  No Known Allergies    Social History  Social History     Socioeconomic History    Marital status:      Spouse name: Not on file    Number of children: Not on file    Years of education: Not on file    Highest education level: Not on file   Occupational History    Not on file   Tobacco Use     Smoking status: Never    Smokeless tobacco: Never   Substance and Sexual Activity    Alcohol use: Never    Drug use: Never    Sexual activity: Not on file   Other Topics Concern    Not on file   Social History Narrative    Not on file     Social Determinants of Health     Financial Resource Strain: Not on file   Food Insecurity: Not on file   Transportation Needs: Not on file   Physical Activity: Not on file   Stress: Not on file   Social Connections: Not on file   Intimate Partner Violence: Not on file   Housing Stability: Not on file       Surgical History  Past Surgical History:   Procedure Laterality Date    BRONCHOSCOPY N/A      SECTION, CLASSIC N/A        Physical Exam:  GENERAL:  Patient is awake, alert, and oriented to person place and time.  Patient appears well nourished and well kept.  Affect Calm, Not Acutely Distressed.  HEENT:  Normocephalic, Atraumatic, EOMI  CARDIOVASCULAR:  Hemodynamically stable.  RESPIRATORY:  Normal respirations with unlabored breathing.  Extremity: Right hand and wrist shows skin is intact.  No erythema or warmth.  There is no clinical signs of infection.  There is no pain in the distal radius or distal ulna.  There is no pain in the scaphoid bone.  Some mild pain with ulnar and radial deviation.  Some mild pain with full pronation and supination.  Pain mainly over the ECU tendon, which reproduces most of her pain.  Questionable ECU tendon subluxation with pronation supination.  Some mild pain over the TFC ligament.  Her flexor and extensor mechanism tact.  Negative Phalen's test negative Emma sign.  Negative Finkelstein's test.  Left wrist with exam for comparison.      Diagnostics: X-rays reviewed      Procedure: None    Assessment: Right wrist ECU tendonitis with possible ECU tendon subluxation     Plan: Odessa presents today for initial evaluation for acute right wrist injury sustained six days ago. X-rays showed no obvious fractures. We placed her into a trauma  splint and Medrol dose Esteban for her ECU tenosynovitis. She will follow-up in two weeks for reevaluation, if no improvement, may consider further imaging such as MRI to evaluate for ECU tendon subluxation.    Orders Placed This Encounter    XR wrist right 3+ views      At the conclusion of the visit there were no further questions by the patient/family regarding their plan of care.  Patient was instructed to call or return with any issues, questions, or concerns regarding their injury and/or treatment plan described above.     07/24/24 at 8:45 AM - Elizabeth Fulton MD  Scribe Attestation  By signing my name below, I, Nam Alvarez, Scribe   attest that this documentation has been prepared under the direction and in the presence of Elizabeth Fulton MD.    Office: [phone]    This note was prepared using voice recognition software.  The details of this note are correct and have been reviewed, and corrected to the best of my ability.  Some grammatical errors may persist related to the Dragon software.

## 2024-08-16 ENCOUNTER — APPOINTMENT (OUTPATIENT)
Dept: ORTHOPEDIC SURGERY | Facility: CLINIC | Age: 39
End: 2024-08-16
Payer: COMMERCIAL

## 2024-12-13 ENCOUNTER — OFFICE VISIT (OUTPATIENT)
Dept: ORTHOPEDIC SURGERY | Facility: CLINIC | Age: 39
End: 2024-12-13
Payer: COMMERCIAL

## 2024-12-13 ENCOUNTER — HOSPITAL ENCOUNTER (OUTPATIENT)
Dept: RADIOLOGY | Facility: CLINIC | Age: 39
Discharge: HOME | End: 2024-12-13
Payer: COMMERCIAL

## 2024-12-13 DIAGNOSIS — M89.8X1 PERISCAPULAR PAIN: ICD-10-CM

## 2024-12-13 DIAGNOSIS — M54.9 BACK PAIN, UNSPECIFIED BACK LOCATION, UNSPECIFIED BACK PAIN LATERALITY, UNSPECIFIED CHRONICITY: ICD-10-CM

## 2024-12-13 DIAGNOSIS — M54.2 NECK PAIN: ICD-10-CM

## 2024-12-13 DIAGNOSIS — M54.12 CERVICAL RADICULOPATHY: Primary | ICD-10-CM

## 2024-12-13 PROCEDURE — 72070 X-RAY EXAM THORAC SPINE 2VWS: CPT

## 2024-12-13 PROCEDURE — 99214 OFFICE O/P EST MOD 30 MIN: CPT | Performed by: ORTHOPAEDIC SURGERY

## 2024-12-13 PROCEDURE — 72040 X-RAY EXAM NECK SPINE 2-3 VW: CPT

## 2024-12-13 NOTE — PROGRESS NOTES
Odessa Hauser is a 39 y.o. female who presents for Pain and New Patient Visit of the Middle Back (Xrays today ) and Pain and New Patient Visit of the Neck (Xrays today ).    HPI:  39-year-old female here for evaluation of mid back and cervical back pain today.  She denies any fever chills nausea vomiting night sweats.  She has no bowel or bladder complaints.    Physical exam:  Well-nourished, well kept.  No lymphangitis or lymphadenopathy in the examined extremities. Affect normal.  Alert and oriented X 3.  Coordination normal.  Patient can rise from a seated position, can sit from a standing position. Can stand on heels and toes.  Patient is tender in the paraspinal musculature of the cervical spine mostly in the lower cervical spine and out into the central parascapular area. range of motion is mildly decreased secondary to some pain and stiffness no weakness no instability to muscle strength. examination of the upper extremities reveals no point tenderness, swelling, or deformity.  Range of motion of the shoulders, elbows, wrists, and fingers are full without crepitance, instability, or exacerbation of pain. Strength is 5/5 throughout. no redness, abrasions, or lesions on the upper extremities bilaterally.  Gross sensation intact to the extremities.  Deep tendon reflexes 1+ and symmetric bilaterally.  Uribe negative.  Tinel's and Phalen's are negative bilaterally.    Imaging studies:  AP lateral flexion-extension plain films of the cervical spine were ordered and reviewed today.  We also ordered and reviewed AP lateral thoracic spine x-rays.    Assessment:  39-year-old female here for evaluation of chronic central parascapular and lower neck pain.  She also has bilateral hand numbness and tingling in no specific pattern.  She is not describing any radiating pain in the arms.  This has been going on since at least 2016.  She did see a surgeon in 2016 who told her that she had a herniated disc in her neck and might  need surgery.  She did not follow-up with that.  Overall the neck and parascapular pain is what bothers her the most 90%.  10% numbness and tingling in her wrists.  I do not get any carpal tunnel findings on physical exam.  I do not get any weakness in the upper extremities.  She did some chiropractic treatment but it has been several years.  She recently saw Dr. Fluton in the walk-in clinic with a right wrist injury after moving a table.  She has no history of any recent physical therapy.  She has not had any cervical surgery, no history of injections.  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 she is a healthy 39-year-old, she does not smoke she is not diabetic.    We have ordered and reviewed tests, x-rays x 2.  We reviewed the notes from Dr. Fulton from July 24 2024th for her right wrist injury.  This is an exacerbation of a chronic problem that is affecting her bodily function.    For complete plan and/or surgical details, please refer to Dr. Sánchez's portion of this split dictation.    -Michael Suárez PA-C    In a face-to-face encounter, I performed a history and physical examination, discussed pertinent diagnostic studies if indicated, and discussed diagnosis and management strategies with both the patient and the midlevel provider.  I reviewed the midlevel's note and agree with the documented findings and plan of care.    Patient with neck pain and some upper thoracic pain and periscapular pain.'s been going on for years but its gotten a little bit worse recently and she is going to make sure there was nothing that we can do for it.  She saw somebody in 2016 for this and she thinks they told her that she has arthritis.  She does have a 70 degree kyphosis of her thoracic spine gives her a little bit of a forward leaning head position of the cervical spine and a little bit of hyperlordosis in the lower cervical segments due to compensation.  Cervical x-rays do  not look that bad though.  She has numbness and tingling in both of her hands which are much less significant than the pain in her neck and upper thoracic and parascapular area.  I think need to work this up further.  We are going to get an MRI of her cervical spine.  We are going to get bilateral EMG nerve conduction studies of the upper extremities to explain the numbness in her hands.  And we are going to get her into physical therapy for the neck and upper thoracic and periscapular pain.  We will see her back after the MRI and EMG.  This is an exacerbation of a chronic problem.    Dedrick Sánchez MD  Orthopedic surgery

## 2024-12-26 ENCOUNTER — APPOINTMENT (OUTPATIENT)
Dept: ORTHOPEDIC SURGERY | Facility: CLINIC | Age: 39
End: 2024-12-26
Payer: COMMERCIAL

## 2024-12-31 ENCOUNTER — HOSPITAL ENCOUNTER (OUTPATIENT)
Dept: RADIOLOGY | Facility: HOSPITAL | Age: 39
Discharge: HOME | End: 2024-12-31
Payer: COMMERCIAL

## 2024-12-31 DIAGNOSIS — M54.2 NECK PAIN: ICD-10-CM

## 2024-12-31 DIAGNOSIS — M54.12 CERVICAL RADICULOPATHY: ICD-10-CM

## 2024-12-31 DIAGNOSIS — M54.9 BACK PAIN, UNSPECIFIED BACK LOCATION, UNSPECIFIED BACK PAIN LATERALITY, UNSPECIFIED CHRONICITY: ICD-10-CM

## 2024-12-31 PROCEDURE — 72141 MRI NECK SPINE W/O DYE: CPT

## 2025-01-09 ENCOUNTER — OFFICE VISIT (OUTPATIENT)
Dept: ORTHOPEDIC SURGERY | Facility: CLINIC | Age: 40
End: 2025-01-09
Payer: COMMERCIAL

## 2025-01-09 DIAGNOSIS — M54.2 NECK PAIN: Primary | ICD-10-CM

## 2025-01-09 PROCEDURE — 99213 OFFICE O/P EST LOW 20 MIN: CPT | Performed by: ORTHOPAEDIC SURGERY

## 2025-01-09 NOTE — PROGRESS NOTES
Chief complaint neck pain    HPI: Patient has cervical thoracic pain.  She has numbness and angling her hands.  She here for follow-up of her MRI of her cervical spine and EMG.    Physical exam: Neck is still minimally tender.  She has pretty good range of motion the neck.  She walks normally.    MRI of the cervical spine was reviewed and is normal except it does  something at the T2 level which may be a meningioma but hard to say for sure as it is partially visualized.  EMG nerve conduction study shows bilateral carpal tunnel syndrome    Assessment/plan: Patient with neck pain only.  Does have some numbness tingling her hands which is coming from carpal tunnel syndrome.  I recommend she see my hand team for the carpal tunnel.  We are going to order a CT scan and MRI of her thoracic spine per the recommendation of the radiologist and we will see her back after that to see if we can done if I was going on around the T2 area.  She has isolated cervical thoracic pain no radicular symptoms.

## 2025-02-05 ENCOUNTER — HOSPITAL ENCOUNTER (OUTPATIENT)
Dept: RADIOLOGY | Facility: HOSPITAL | Age: 40
Discharge: HOME | End: 2025-02-05
Payer: COMMERCIAL

## 2025-02-05 ENCOUNTER — APPOINTMENT (OUTPATIENT)
Dept: RADIOLOGY | Facility: HOSPITAL | Age: 40
End: 2025-02-05
Payer: COMMERCIAL

## 2025-02-05 DIAGNOSIS — M54.2 NECK PAIN: ICD-10-CM

## 2025-02-05 PROCEDURE — 72157 MRI CHEST SPINE W/O & W/DYE: CPT

## 2025-02-05 PROCEDURE — 2550000001 HC RX 255 CONTRASTS: Performed by: ORTHOPAEDIC SURGERY

## 2025-02-05 PROCEDURE — 72157 MRI CHEST SPINE W/O & W/DYE: CPT | Performed by: RADIOLOGY

## 2025-02-05 PROCEDURE — A9575 INJ GADOTERATE MEGLUMI 0.1ML: HCPCS | Performed by: ORTHOPAEDIC SURGERY

## 2025-02-05 RX ORDER — GADOTERATE MEGLUMINE 376.9 MG/ML
0.2 INJECTION INTRAVENOUS
Status: COMPLETED | OUTPATIENT
Start: 2025-02-05 | End: 2025-02-05

## 2025-02-05 RX ADMIN — GADOTERATE MEGLUMINE 13.5 ML: 376.9 INJECTION INTRAVENOUS at 07:46

## 2025-05-20 ENCOUNTER — OFFICE VISIT (OUTPATIENT)
Facility: CLINIC | Age: 40
End: 2025-05-20
Payer: COMMERCIAL

## 2025-05-20 VITALS
BODY MASS INDEX: 32.54 KG/M2 | SYSTOLIC BLOOD PRESSURE: 132 MMHG | DIASTOLIC BLOOD PRESSURE: 92 MMHG | TEMPERATURE: 98.1 F | HEART RATE: 75 BPM | WEIGHT: 176.8 LBS | HEIGHT: 62 IN

## 2025-05-20 DIAGNOSIS — D32.1 MENINGIOMA, SPINAL (MULTI): Primary | ICD-10-CM

## 2025-05-20 PROCEDURE — 99204 OFFICE O/P NEW MOD 45 MIN: CPT | Performed by: NEUROLOGICAL SURGERY

## 2025-05-20 PROCEDURE — 99214 OFFICE O/P EST MOD 30 MIN: CPT | Performed by: NEUROLOGICAL SURGERY

## 2025-05-20 PROCEDURE — 3008F BODY MASS INDEX DOCD: CPT | Performed by: NEUROLOGICAL SURGERY

## 2025-05-20 ASSESSMENT — PATIENT HEALTH QUESTIONNAIRE - PHQ9
2. FEELING DOWN, DEPRESSED OR HOPELESS: NOT AT ALL
1. LITTLE INTEREST OR PLEASURE IN DOING THINGS: NOT AT ALL
SUM OF ALL RESPONSES TO PHQ9 QUESTIONS 1 & 2: 0

## 2025-05-20 ASSESSMENT — PAIN SCALES - GENERAL: PAINLEVEL_OUTOF10: 4

## 2025-05-28 NOTE — PROGRESS NOTES
[facility] [place]  Department of Neurological Surgery  New Patient Visit    History of Present Illness:  Odessa Hauser is a 40 y.o. year old female who presents to the spine clinic with a chief complaint of numbness and tingling over the left upper thoracic paraspinal area.  She also has some mid back pain in the same area.  She notes a history of mild carpal tunnel syndrome.  She is referred to me by my colleague Dr. Dedrick Sánchez.    Prior spine surgeries: No  Prior physical therapy: No  Prior spinal injections: No    Diabetic: No  Osteoporosis: No  BMI: Body mass index is 32.34 kg/m².    14/14 systems reviewed and negative other than what is listed in the history of present illness    Problem List[1]  Medical History[2]  Surgical History[3]  Social History     Tobacco Use    Smoking status: Never    Smokeless tobacco: Never   Substance Use Topics    Alcohol use: Never     family history includes Asthma in her father; COPD in her father; Diabetes in her father; Hypertension in her father; Skin cancer in her maternal grandfather.  Current Medications[4]  Allergies[5]    Physical Examination  General: well developed, awake/alert/oriented x3, no distress, alert and cooperative  Head/Neck: neck Supple, no apparent injury  ENMT: mucous membranes moist, no apparent injury, no lesions seen  Cardiovascular: no pitting edema, no JVD  Respiratory/Thorax: Normal breath sounds with good chest expansion, thorax symmetric  Skin: warm and dry, no lesions, no rashes    Neurological/Musculoskeletal:    - Posture: normal coronal & sagittal alignment    - Range of motion: full active & passive range of motion    - Muscle Bulk: normal and symmetric in all extremities    - Paraspinal muscle spasm/tenderness absent    - Motor Strength: 5/5 throughout all extremities    - Sensation: intact to light touch    - Reflexes: normal, no clonus, negative Uribe's sign      Results  I personally reviewed and interpreted the  imaging results, which included an MRI of the thoracic spine performed on February 5, 2025.  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  This does result in mass effect upon the spinal cord.  There is mild enhancement.    Assessment and Plan:  Odessa Hauser is a 40 y.o. year old female who presents to the spine clinic with a chief complaint of numbness and tingling over the left upper thoracic paraspinal area.  She also has some mid back pain in the same area.  She notes a history of mild carpal tunnel syndrome.  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  This does result in mass effect upon the spinal cord.  There is mild enhancement.  I had a lengthy discussion with the patient regarding his condition and treatment options.  I told her that the mass could be the cause of her localized pain and numbness, but fortunately she does not have any symptoms or signs of myelopathy.  I had a lengthy discussion regarding treatment options, including conservative care as well as different surgical options.  At this time, we have agreed to proceed with observation.  I am going to order a repeat MRI and CT to be performed in August 2025.  I will plan to see the patient back in clinic once the studies are complete.      I have reviewed all prior documentation and reviewed the electronic medical record since admission. I have personally have reviewed all advanced imaging not just the reports and used my interpretation as documented as the relevant findings. I have reviewed the risks and benefits of all treatment recommendations listed in this note with the patient and family.       The above clinical summary has been dictated with voice recognition software. It has not been proofread for grammatical errors, typographical mistakes, or other semantic inconsistencies.    Thank you for visiting our office today. It was our pleasure to take part in your healthcare.     Do not  hesitate to call with any questions regarding your plan of care after leaving at [phone] M-F 8am-4pm.     To clinicians, thank you very much for this kind referral. It is a privilege to partner with you in the care of your patients. My office would be delighted to assist you with any further consultations or with questions regarding the plan of care outlined. Do not hesitate to call the office or contact me directly.       Sincerely,      Atilio Cifuentes MD PhD  Attending Neurosurgeon  [facility]   of Neurological Surgery  [facility] [employer]    [facility]  [address]  [place] [address]    [facility]  [address]  [address]  [place] [address]    Office: [phone]  Fax: [phone]           [1]   Patient Active Problem List  Diagnosis    Asthma, exercise induced    Class 1 obesity with body mass index (BMI) of 32.0 to 32.9 in adult    Obstruction, esophagus    RAD (reactive airway disease) ([facility])    Spondylosis of lumbar region without myelopathy or radiculopathy    Spondylosis of thoracic region without myelopathy or radiculopathy    Former smoker    Encounter to discuss test results    Esophageal obstruction due to food impaction    Gastroesophageal reflux disease without esophagitis   [2]   Past Medical History:  Diagnosis Date    Asthma     patient states controlled with inhalers    GERD (gastroesophageal reflux disease)     states protonix helps   [3]   Past Surgical History:  Procedure Laterality Date    BRONCHOSCOPY N/A      SECTION, CLASSIC N/A    [4]   Current Outpatient Medications:     albuterol (Ventolin HFA) 90 mcg/actuation inhaler, Inhale 2 puffs every 6 hours if needed for wheezing., Disp: 18 g, Rfl: 1    fluticasone propion-salmeteroL (Advair Diskus) 250-50 mcg/dose diskus inhaler, Inhale 1 puff  every 12 hours. Rinse mouth with water after use to reduce aftertaste and incidence of candidiasis. Do not swallow., Disp: , Rfl:     hyoscyamine (Anaspaz, Levsin) 0.125 mg tablet, Take 1 tablet (0.125 mg) by mouth 3 times a day before meals., Disp: 90 tablet, Rfl: 11    pantoprazole (ProtoNix) 40 mg EC tablet, Take 1 tablet (40 mg) by mouth once daily in the morning. Take before meals., Disp: 30 tablet, Rfl: 11  [5] No Known Allergies

## 2025-07-30 ENCOUNTER — HOSPITAL ENCOUNTER (OUTPATIENT)
Dept: RADIOLOGY | Facility: HOSPITAL | Age: 40
Discharge: HOME | End: 2025-07-30
Payer: COMMERCIAL

## 2025-07-30 ENCOUNTER — APPOINTMENT (OUTPATIENT)
Dept: RADIOLOGY | Facility: HOSPITAL | Age: 40
End: 2025-07-30
Payer: COMMERCIAL

## 2025-07-30 VITALS — BODY MASS INDEX: 32.34 KG/M2 | WEIGHT: 176.81 LBS

## 2025-07-30 DIAGNOSIS — D32.1 MENINGIOMA, SPINAL (MULTI): ICD-10-CM

## 2025-07-30 PROCEDURE — 72157 MRI CHEST SPINE W/O & W/DYE: CPT

## 2025-07-30 PROCEDURE — 72128 CT CHEST SPINE W/O DYE: CPT

## 2025-07-30 PROCEDURE — 72157 MRI CHEST SPINE W/O & W/DYE: CPT | Performed by: RADIOLOGY

## 2025-07-30 PROCEDURE — 2550000001 HC RX 255 CONTRASTS: Performed by: NEUROLOGICAL SURGERY

## 2025-07-30 PROCEDURE — 72128 CT CHEST SPINE W/O DYE: CPT | Performed by: RADIOLOGY

## 2025-07-30 PROCEDURE — A9575 INJ GADOTERATE MEGLUMI 0.1ML: HCPCS | Performed by: NEUROLOGICAL SURGERY

## 2025-07-30 RX ORDER — GADOTERATE MEGLUMINE 376.9 MG/ML
16 INJECTION INTRAVENOUS
Status: COMPLETED | OUTPATIENT
Start: 2025-07-30 | End: 2025-07-30

## 2025-07-30 RX ADMIN — GADOTERATE MEGLUMINE 16 ML: 376.9 INJECTION INTRAVENOUS at 16:47

## 2025-08-05 ENCOUNTER — OFFICE VISIT (OUTPATIENT)
Facility: CLINIC | Age: 40
End: 2025-08-05
Payer: COMMERCIAL

## 2025-08-05 VITALS
WEIGHT: 179.2 LBS | TEMPERATURE: 98.2 F | SYSTOLIC BLOOD PRESSURE: 104 MMHG | HEART RATE: 83 BPM | HEIGHT: 62 IN | BODY MASS INDEX: 32.97 KG/M2 | DIASTOLIC BLOOD PRESSURE: 70 MMHG

## 2025-08-05 DIAGNOSIS — D32.1 MENINGIOMA, SPINAL (MULTI): Primary | ICD-10-CM

## 2025-08-05 PROCEDURE — 99214 OFFICE O/P EST MOD 30 MIN: CPT | Performed by: NEUROLOGICAL SURGERY

## 2025-08-05 PROCEDURE — 3008F BODY MASS INDEX DOCD: CPT | Performed by: NEUROLOGICAL SURGERY

## 2025-08-05 PROCEDURE — 1036F TOBACCO NON-USER: CPT | Performed by: NEUROLOGICAL SURGERY

## 2025-08-05 PROCEDURE — 99212 OFFICE O/P EST SF 10 MIN: CPT

## 2025-08-05 ASSESSMENT — PAIN SCALES - GENERAL: PAINLEVEL_OUTOF10: 3

## 2025-08-05 ASSESSMENT — PATIENT HEALTH QUESTIONNAIRE - PHQ9
SUM OF ALL RESPONSES TO PHQ9 QUESTIONS 1 & 2: 0
1. LITTLE INTEREST OR PLEASURE IN DOING THINGS: NOT AT ALL
2. FEELING DOWN, DEPRESSED OR HOPELESS: NOT AT ALL

## 2025-08-05 NOTE — PROGRESS NOTES
[facility] [place]  Department of Neurological Surgery  Established Patient Visit    History of Present Illness  Odessa Hauser is a 40 y.o. 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  Her presenting symptoms were numbness and pain in the left upper thoracic paraspinal area.  I last saw her in May 2025.  Her symptoms are unchanged at this time.  She denies any overt symptoms of thoracic myelopathy.  She is here today to go over the results of her repeat imaging.    BMI: Body mass index is 32.78 kg/m².    14/14 systems reviewed and negative other than what is listed in the history of present illness    Problem List[1]  Medical History[2]  Surgical History[3]  Social History     Tobacco Use    Smoking status: Never    Smokeless tobacco: Never   Substance Use Topics    Alcohol use: Not Currently     family history includes Asthma in her father; COPD in her father; Dementia in her maternal grandmother; Diabetes in her father; Hypertension in her father; Skin cancer in her maternal grandfather.  Current Medications[4]  Allergies[5]    Physical Examination:  General: well developed, awake/alert/oriented x3, no distress, alert and cooperative  Head/Neck: neck Supple, no apparent injury  ENMT: mucous membranes moist, no apparent injury, no lesions seen  Cardiovascular: no pitting edema, no JVD  Respiratory/Thorax: Normal breath sounds with good chest expansion, thorax symmetric  Skin: warm and dry, no lesions, no rashes    Neurological/Musculoskeletal:    - Posture: normal coronal & sagittal alignment    - Range of motion: full active & passive range of motion    - Muscle Bulk: normal and symmetric in all extremities    - Paraspinal muscle spasm/tenderness absent    - Motor Strength: 5/5 throughout all extremities    - Sensation: intact to light touch    - Reflexes: normal      Results:  I personally reviewed  and interpreted the imaging results, which included an MRI and CT of the thoracic spine performed on July 30, 2025.  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  It is dorsal and lateral to the spinal cord.  There is mass effect upon the spinal cord.  The mass does enhance with contrast and is heavily calcified.    Assessment and Plan:  Odessa Hauser is a 40 y.o. 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  Her presenting symptoms were numbness and pain in the left upper thoracic paraspinal area.  I last saw her in May 2025.  Her symptoms are unchanged at this time.  She denies any overt symptoms of thoracic myelopathy.  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  It is dorsal and lateral to the spinal cord.  There is mass effect upon the spinal cord.  The mass does enhance with contrast and is heavily calcified.  I had a lengthy discussion with the patient regarding her condition and treatment options.  This does have the appearance of a benign meningioma.  I discussed the risks and benefits of a surgical resection.  At this time, we have agreed to proceed with further observation.  Will plan to see her back in clinic in 1 year to assess her progress.  I told her that should she have any new or worsening symptoms in the meantime, that she should call my office or present to an emergency department for evaluation.      I have reviewed all prior documentation and reviewed the electronic medical record since admission. I have personally have reviewed all advanced imaging not just the reports and used my interpretation as documented as the relevant findings. I have reviewed the risks and benefits of all treatment recommendations listed in this note with the patient and family.       The above clinical summary has been dictated with voice recognition software. It has not been  proofread for grammatical errors, typographical mistakes, or other semantic inconsistencies.    Thank you for visiting our office today. It was our pleasure to take part in your healthcare.     Do not hesitate to call with any questions regarding your plan of care after leaving at [phone] M-F 8am-4pm.     To clinicians, thank you very much for this kind referral. It is a privilege to partner with you in the care of your patients. My office would be delighted to assist you with any further consultations or with questions regarding the plan of care outlined. Do not hesitate to call the office or contact me directly.       Sincerely,      Atilio Cifuentes MD PhD  Attending Neurosurgeon  [facility]   of Neurological Surgery  [facility]    [address]  [address]  [address] [place] [address]  [address] [place] [address]    Office: [phone]  Fax: [phone]           [1]   Patient Active Problem List  Diagnosis    Asthma, exercise induced    Class 1 obesity with body mass index (BMI) of 32.0 to 32.9 in adult    Obstruction, esophagus    RAD (reactive airway disease) ([facility])    Spondylosis of lumbar region without myelopathy or radiculopathy    Spondylosis of thoracic region without myelopathy or radiculopathy    Former smoker    Encounter to discuss test results    Esophageal obstruction due to food impaction    Gastroesophageal reflux disease without esophagitis   [2]   Past Medical History:  Diagnosis Date    Asthma     patient states controlled with inhalers    Cervical disc disorder     GERD (gastroesophageal reflux disease)     states protonix helps    Low back pain     Neck pain     Seizures (Multi) 1992    Thoracic disc disorder    [3]   Past Surgical History:  Procedure Laterality Date     BRONCHOSCOPY N/A      SECTION, CLASSIC N/A    [4]   Current Outpatient Medications:     albuterol (Ventolin HFA) 90 mcg/actuation inhaler, Inhale 2 puffs every 6 hours if needed for wheezing., Disp: 18 g, Rfl: 1    fluticasone propion-salmeteroL (Advair Diskus) 250-50 mcg/dose diskus inhaler, Inhale 1 puff every 12 hours. Rinse mouth with water after use to reduce aftertaste and incidence of candidiasis. Do not swallow., Disp: , Rfl:     hyoscyamine (Anaspaz, Levsin) 0.125 mg tablet, Take 1 tablet (0.125 mg) by mouth 3 times a day before meals., Disp: 90 tablet, Rfl: 11    pantoprazole (ProtoNix) 40 mg EC tablet, Take 1 tablet (40 mg) by mouth once daily in the morning. Take before meals., Disp: 30 tablet, Rfl: 11  [5] No Known Allergies